# Patient Record
Sex: FEMALE | Race: WHITE | Employment: OTHER | URBAN - METROPOLITAN AREA
[De-identification: names, ages, dates, MRNs, and addresses within clinical notes are randomized per-mention and may not be internally consistent; named-entity substitution may affect disease eponyms.]

---

## 2017-01-01 ENCOUNTER — HOSPITAL ENCOUNTER (EMERGENCY)
Facility: HOSPITAL | Age: 82
Discharge: HOME | End: 2017-01-01
Attending: EMERGENCY MEDICINE
Payer: MEDICARE

## 2017-01-01 ENCOUNTER — HOSPITAL ENCOUNTER (INPATIENT)
Facility: HOSPITAL | Age: 82
LOS: 9 days | Discharge: HOME HEALTH | DRG: 266 | End: 2017-01-01
Attending: INTERNAL MEDICINE | Admitting: INTERNAL MEDICINE
Payer: MEDICARE

## 2017-01-01 ENCOUNTER — APPOINTMENT (OUTPATIENT)
Dept: INTERVENTIONAL RADIOLOGY/VASCULAR | Facility: HOSPITAL | Age: 82
DRG: 280 | End: 2017-01-01
Attending: INTERNAL MEDICINE
Payer: MEDICARE

## 2017-01-01 ENCOUNTER — APPOINTMENT (OUTPATIENT)
Dept: CV DIAGNOSTICS | Facility: HOSPITAL | Age: 82
DRG: 266 | End: 2017-01-01
Attending: INTERNAL MEDICINE
Payer: MEDICARE

## 2017-01-01 ENCOUNTER — HOSPITAL ENCOUNTER (INPATIENT)
Facility: HOSPITAL | Age: 82
LOS: 9 days | Discharge: HOSPICE | DRG: 280 | End: 2017-01-01
Attending: EMERGENCY MEDICINE | Admitting: INTERNAL MEDICINE
Payer: MEDICARE

## 2017-01-01 ENCOUNTER — APPOINTMENT (OUTPATIENT)
Dept: PHYSICAL THERAPY | Facility: HOSPITAL | Age: 82
End: 2017-01-01
Attending: INTERNAL MEDICINE
Payer: MEDICARE

## 2017-01-01 ENCOUNTER — APPOINTMENT (OUTPATIENT)
Dept: GENERAL RADIOLOGY | Facility: HOSPITAL | Age: 82
DRG: 280 | End: 2017-01-01
Attending: INTERNAL MEDICINE
Payer: MEDICARE

## 2017-01-01 ENCOUNTER — APPOINTMENT (OUTPATIENT)
Dept: ULTRASOUND IMAGING | Facility: HOSPITAL | Age: 82
DRG: 266 | End: 2017-01-01
Attending: HOSPITALIST
Payer: MEDICARE

## 2017-01-01 ENCOUNTER — APPOINTMENT (OUTPATIENT)
Dept: GENERAL RADIOLOGY | Facility: HOSPITAL | Age: 82
DRG: 280 | End: 2017-01-01
Attending: HOSPITALIST
Payer: MEDICARE

## 2017-01-01 ENCOUNTER — APPOINTMENT (OUTPATIENT)
Dept: GENERAL RADIOLOGY | Facility: HOSPITAL | Age: 82
DRG: 280 | End: 2017-01-01
Attending: EMERGENCY MEDICINE
Payer: MEDICARE

## 2017-01-01 ENCOUNTER — HOSPITAL ENCOUNTER (INPATIENT)
Facility: HOSPITAL | Age: 82
LOS: 1 days | DRG: 283 | End: 2017-01-01
Attending: HOSPITALIST | Admitting: HOSPITALIST
Payer: OTHER MISCELLANEOUS

## 2017-01-01 ENCOUNTER — APPOINTMENT (OUTPATIENT)
Dept: INTERVENTIONAL RADIOLOGY/VASCULAR | Facility: HOSPITAL | Age: 82
DRG: 266 | End: 2017-01-01
Attending: NURSE PRACTITIONER
Payer: MEDICARE

## 2017-01-01 ENCOUNTER — HOSPITAL ENCOUNTER (OUTPATIENT)
Dept: INTERVENTIONAL RADIOLOGY/VASCULAR | Facility: HOSPITAL | Age: 82
Discharge: HOME | End: 2017-01-01
Attending: INTERNAL MEDICINE | Admitting: INTERNAL MEDICINE
Payer: MEDICARE

## 2017-01-01 ENCOUNTER — HOSPITAL ENCOUNTER (OUTPATIENT)
Dept: PHYSICAL THERAPY | Facility: HOSPITAL | Age: 82
Setting detail: THERAPIES SERIES
Discharge: HOME | End: 2017-01-01
Attending: INTERNAL MEDICINE
Payer: MEDICARE

## 2017-01-01 ENCOUNTER — APPOINTMENT (OUTPATIENT)
Dept: GENERAL RADIOLOGY | Facility: HOSPITAL | Age: 82
DRG: 266 | End: 2017-01-01
Attending: INTERNAL MEDICINE
Payer: MEDICARE

## 2017-01-01 ENCOUNTER — SURGERY (OUTPATIENT)
Age: 82
End: 2017-01-01

## 2017-01-01 ENCOUNTER — APPOINTMENT (OUTPATIENT)
Dept: CV DIAGNOSTICS | Facility: HOSPITAL | Age: 82
DRG: 280 | End: 2017-01-01
Attending: INTERNAL MEDICINE
Payer: MEDICARE

## 2017-01-01 ENCOUNTER — ANESTHESIA EVENT (OUTPATIENT)
Dept: CARDIAC SURGERY | Facility: HOSPITAL | Age: 82
End: 2017-01-01

## 2017-01-01 ENCOUNTER — ANESTHESIA (OUTPATIENT)
Dept: CARDIAC SURGERY | Facility: HOSPITAL | Age: 82
End: 2017-01-01

## 2017-01-01 ENCOUNTER — HOSPITAL ENCOUNTER (OUTPATIENT)
Dept: CT IMAGING | Facility: HOSPITAL | Age: 82
Discharge: HOME | End: 2017-01-01
Payer: MEDICARE

## 2017-01-01 ENCOUNTER — HOSPITAL ENCOUNTER (OUTPATIENT)
Dept: ULTRASOUND IMAGING | Facility: HOSPITAL | Age: 82
Discharge: HOME | End: 2017-01-01
Payer: MEDICARE

## 2017-01-01 ENCOUNTER — APPOINTMENT (OUTPATIENT)
Dept: GENERAL RADIOLOGY | Facility: HOSPITAL | Age: 82
End: 2017-01-01
Attending: EMERGENCY MEDICINE
Payer: MEDICARE

## 2017-01-01 ENCOUNTER — APPOINTMENT (OUTPATIENT)
Dept: CT IMAGING | Facility: HOSPITAL | Age: 82
DRG: 280 | End: 2017-01-01
Attending: EMERGENCY MEDICINE
Payer: MEDICARE

## 2017-01-01 ENCOUNTER — HOSPITAL ENCOUNTER (OUTPATIENT)
Dept: CT IMAGING | Facility: HOSPITAL | Age: 82
Discharge: HOME | End: 2017-01-01
Attending: NURSE PRACTITIONER
Payer: MEDICARE

## 2017-01-01 ENCOUNTER — HOSPITAL ENCOUNTER (OUTPATIENT)
Dept: ULTRASOUND IMAGING | Facility: HOSPITAL | Age: 82
Discharge: HOME | End: 2017-01-01
Attending: INTERNAL MEDICINE
Payer: MEDICARE

## 2017-01-01 VITALS
RESPIRATION RATE: 11 BRPM | HEART RATE: 68 BPM | HEIGHT: 66 IN | BODY MASS INDEX: 18.32 KG/M2 | OXYGEN SATURATION: 97 % | DIASTOLIC BLOOD PRESSURE: 70 MMHG | SYSTOLIC BLOOD PRESSURE: 149 MMHG | WEIGHT: 114 LBS | TEMPERATURE: 98 F

## 2017-01-01 VITALS
HEIGHT: 66 IN | HEART RATE: 70 BPM | SYSTOLIC BLOOD PRESSURE: 139 MMHG | BODY MASS INDEX: 17.47 KG/M2 | OXYGEN SATURATION: 96 % | TEMPERATURE: 99 F | RESPIRATION RATE: 42 BRPM | DIASTOLIC BLOOD PRESSURE: 70 MMHG | WEIGHT: 108.69 LBS

## 2017-01-01 VITALS
HEART RATE: 66 BPM | HEIGHT: 66 IN | OXYGEN SATURATION: 97 % | SYSTOLIC BLOOD PRESSURE: 146 MMHG | TEMPERATURE: 98 F | RESPIRATION RATE: 16 BRPM | DIASTOLIC BLOOD PRESSURE: 73 MMHG | BODY MASS INDEX: 19.61 KG/M2 | WEIGHT: 122 LBS

## 2017-01-01 VITALS
BODY MASS INDEX: 19 KG/M2 | DIASTOLIC BLOOD PRESSURE: 72 MMHG | SYSTOLIC BLOOD PRESSURE: 156 MMHG | WEIGHT: 117.94 LBS | OXYGEN SATURATION: 97 % | HEART RATE: 76 BPM | RESPIRATION RATE: 17 BRPM | TEMPERATURE: 98 F

## 2017-01-01 VITALS
HEART RATE: 61 BPM | RESPIRATION RATE: 17 BRPM | WEIGHT: 121.06 LBS | BODY MASS INDEX: 20 KG/M2 | DIASTOLIC BLOOD PRESSURE: 78 MMHG | OXYGEN SATURATION: 100 % | TEMPERATURE: 98 F | SYSTOLIC BLOOD PRESSURE: 132 MMHG

## 2017-01-01 VITALS
HEIGHT: 66 IN | WEIGHT: 110.25 LBS | OXYGEN SATURATION: 97 % | HEART RATE: 68 BPM | TEMPERATURE: 98 F | BODY MASS INDEX: 17.72 KG/M2 | SYSTOLIC BLOOD PRESSURE: 126 MMHG | DIASTOLIC BLOOD PRESSURE: 66 MMHG | RESPIRATION RATE: 16 BRPM

## 2017-01-01 VITALS — RESPIRATION RATE: 26 BRPM

## 2017-01-01 DIAGNOSIS — G89.29 CHRONIC BILATERAL LOW BACK PAIN WITHOUT SCIATICA: Primary | ICD-10-CM

## 2017-01-01 DIAGNOSIS — I35.0 NONRHEUMATIC AORTIC VALVE STENOSIS: ICD-10-CM

## 2017-01-01 DIAGNOSIS — M54.16 LUMBAR RADICULITIS: Primary | ICD-10-CM

## 2017-01-01 DIAGNOSIS — I35.0 SEVERE AORTIC STENOSIS: ICD-10-CM

## 2017-01-01 DIAGNOSIS — M79.89 LEFT ARM SWELLING: ICD-10-CM

## 2017-01-01 DIAGNOSIS — Z01.810 PRE-OPERATIVE CARDIOVASCULAR EXAMINATION: ICD-10-CM

## 2017-01-01 DIAGNOSIS — R42 LIGHTHEADEDNESS: Primary | ICD-10-CM

## 2017-01-01 DIAGNOSIS — M54.42 ACUTE LEFT-SIDED LOW BACK PAIN WITH LEFT-SIDED SCIATICA: Primary | ICD-10-CM

## 2017-01-01 DIAGNOSIS — I82.622 ARM DVT (DEEP VENOUS THROMBOEMBOLISM), ACUTE, LEFT (HCC): Primary | ICD-10-CM

## 2017-01-01 DIAGNOSIS — M48.061 LUMBAR STENOSIS: ICD-10-CM

## 2017-01-01 DIAGNOSIS — R07.9 ACUTE CHEST PAIN: Primary | ICD-10-CM

## 2017-01-01 DIAGNOSIS — M54.50 CHRONIC BILATERAL LOW BACK PAIN WITHOUT SCIATICA: Primary | ICD-10-CM

## 2017-01-01 LAB
ALBUMIN SERPL-MCNC: 3.2 G/DL (ref 3.5–4.8)
ALBUMIN SERPL-MCNC: 3.7 G/DL (ref 3.5–4.8)
ALP LIVER SERPL-CCNC: 114 U/L (ref 55–142)
ALP LIVER SERPL-CCNC: 117 U/L (ref 55–142)
ALT SERPL-CCNC: 19 U/L (ref 14–54)
ALT SERPL-CCNC: 21 U/L (ref 14–54)
AST SERPL-CCNC: 25 U/L (ref 15–41)
AST SERPL-CCNC: 27 U/L (ref 15–41)
ATRIAL RATE: 65 BPM
ATRIAL RATE: 70 BPM
BASOPHILS # BLD AUTO: 0.05 X10(3) UL (ref 0–0.1)
BASOPHILS # BLD AUTO: 0.06 X10(3) UL (ref 0–0.1)
BASOPHILS NFR BLD AUTO: 1 %
BASOPHILS NFR BLD AUTO: 1.3 %
BILIRUB SERPL-MCNC: 0.4 MG/DL (ref 0.1–2)
BILIRUB SERPL-MCNC: 0.5 MG/DL (ref 0.1–2)
BILIRUB UR QL STRIP.AUTO: NEGATIVE
BILIRUB UR QL STRIP.AUTO: NEGATIVE
BUN BLD-MCNC: 22 MG/DL (ref 8–20)
BUN BLD-MCNC: 27 MG/DL (ref 8–20)
CALCIUM BLD-MCNC: 8.8 MG/DL (ref 8.3–10.3)
CALCIUM BLD-MCNC: 9.6 MG/DL (ref 8.3–10.3)
CHLORIDE: 105 MMOL/L (ref 101–111)
CHLORIDE: 108 MMOL/L (ref 101–111)
CLARITY UR REFRACT.AUTO: CLEAR
CLARITY UR REFRACT.AUTO: CLEAR
CO2: 28 MMOL/L (ref 22–32)
CO2: 28 MMOL/L (ref 22–32)
COLOR UR AUTO: YELLOW
COLOR UR AUTO: YELLOW
CREAT BLD-MCNC: 0.72 MG/DL (ref 0.55–1.02)
CREAT BLD-MCNC: 0.75 MG/DL (ref 0.55–1.02)
EOSINOPHIL # BLD AUTO: 0.09 X10(3) UL (ref 0–0.3)
EOSINOPHIL # BLD AUTO: 0.14 X10(3) UL (ref 0–0.3)
EOSINOPHIL NFR BLD AUTO: 1.8 %
EOSINOPHIL NFR BLD AUTO: 3 %
ERYTHROCYTE [DISTWIDTH] IN BLOOD BY AUTOMATED COUNT: 12.6 % (ref 11.5–16)
ERYTHROCYTE [DISTWIDTH] IN BLOOD BY AUTOMATED COUNT: 12.9 % (ref 11.5–16)
GLUCOSE BLD-MCNC: 90 MG/DL (ref 70–99)
GLUCOSE BLD-MCNC: 96 MG/DL (ref 70–99)
GLUCOSE UR STRIP.AUTO-MCNC: NEGATIVE MG/DL
GLUCOSE UR STRIP.AUTO-MCNC: NEGATIVE MG/DL
HAV IGM SER QL: 2.3 MG/DL (ref 1.7–3)
HCT VFR BLD AUTO: 31.7 % (ref 34–50)
HCT VFR BLD AUTO: 36 % (ref 34–50)
HGB BLD-MCNC: 10 G/DL (ref 12–16)
HGB BLD-MCNC: 11.8 G/DL (ref 12–16)
IMMATURE GRANULOCYTE COUNT: 0.01 X10(3) UL (ref 0–1)
IMMATURE GRANULOCYTE COUNT: 0.01 X10(3) UL (ref 0–1)
IMMATURE GRANULOCYTE RATIO %: 0.2 %
IMMATURE GRANULOCYTE RATIO %: 0.2 %
KETONES UR STRIP.AUTO-MCNC: NEGATIVE MG/DL
KETONES UR STRIP.AUTO-MCNC: NEGATIVE MG/DL
LEUKOCYTE ESTERASE UR QL STRIP.AUTO: NEGATIVE
LEUKOCYTE ESTERASE UR QL STRIP.AUTO: NEGATIVE
LYMPHOCYTES # BLD AUTO: 0.89 X10(3) UL (ref 0.9–4)
LYMPHOCYTES # BLD AUTO: 1.08 X10(3) UL (ref 0.9–4)
LYMPHOCYTES NFR BLD AUTO: 17.6 %
LYMPHOCYTES NFR BLD AUTO: 23.4 %
M PROTEIN MFR SERPL ELPH: 7.6 G/DL (ref 6.1–8.3)
M PROTEIN MFR SERPL ELPH: 8 G/DL (ref 6.1–8.3)
MCH RBC QN AUTO: 30.3 PG (ref 27–33.2)
MCH RBC QN AUTO: 31.3 PG (ref 27–33.2)
MCHC RBC AUTO-ENTMCNC: 31.5 G/DL (ref 31–37)
MCHC RBC AUTO-ENTMCNC: 32.8 G/DL (ref 31–37)
MCV RBC AUTO: 95.5 FL (ref 81–100)
MCV RBC AUTO: 96.1 FL (ref 81–100)
MONOCYTES # BLD AUTO: 0.62 X10(3) UL (ref 0.1–0.6)
MONOCYTES # BLD AUTO: 0.66 X10(3) UL (ref 0.1–0.6)
MONOCYTES NFR BLD AUTO: 13.1 %
MONOCYTES NFR BLD AUTO: 13.4 %
NEUTROPHIL ABS PRELIM: 2.7 X10 (3) UL (ref 1.3–6.7)
NEUTROPHIL ABS PRELIM: 3.35 X10 (3) UL (ref 1.3–6.7)
NEUTROPHILS # BLD AUTO: 2.7 X10(3) UL (ref 1.3–6.7)
NEUTROPHILS # BLD AUTO: 3.35 X10(3) UL (ref 1.3–6.7)
NEUTROPHILS NFR BLD AUTO: 58.7 %
NEUTROPHILS NFR BLD AUTO: 66.3 %
NITRITE UR QL STRIP.AUTO: NEGATIVE
NITRITE UR QL STRIP.AUTO: NEGATIVE
P AXIS: 95 DEGREES
P-R INTERVAL: 162 MS
P-R INTERVAL: 186 MS
PH UR STRIP.AUTO: 5 [PH] (ref 4.5–8)
PH UR STRIP.AUTO: 6 [PH] (ref 4.5–8)
PHOSPHATE SERPL-MCNC: 2.8 MG/DL (ref 2.5–4.9)
PLATELET # BLD AUTO: 190 10(3)UL (ref 150–450)
PLATELET # BLD AUTO: 213 10(3)UL (ref 150–450)
POTASSIUM SERPL-SCNC: 3.6 MMOL/L (ref 3.6–5.1)
POTASSIUM SERPL-SCNC: 3.8 MMOL/L (ref 3.6–5.1)
PROT UR STRIP.AUTO-MCNC: NEGATIVE MG/DL
PROT UR STRIP.AUTO-MCNC: NEGATIVE MG/DL
Q-T INTERVAL: 468 MS
Q-T INTERVAL: 482 MS
QRS DURATION: 100 MS
QRS DURATION: 172 MS
QTC CALCULATION (BEZET): 486 MS
QTC CALCULATION (BEZET): 520 MS
R AXIS: -22 DEGREES
R AXIS: 47 DEGREES
RBC # BLD AUTO: 3.3 X10(6)UL (ref 3.8–5.1)
RBC # BLD AUTO: 3.77 X10(6)UL (ref 3.8–5.1)
RBC UR QL AUTO: NEGATIVE
RED CELL DISTRIBUTION WIDTH-SD: 43.9 FL (ref 35.1–46.3)
RED CELL DISTRIBUTION WIDTH-SD: 45.4 FL (ref 35.1–46.3)
SODIUM SERPL-SCNC: 139 MMOL/L (ref 136–144)
SODIUM SERPL-SCNC: 142 MMOL/L (ref 136–144)
SP GR UR STRIP.AUTO: 1.01 (ref 1–1.03)
SP GR UR STRIP.AUTO: 1.02 (ref 1–1.03)
T AXIS: 121 DEGREES
T AXIS: 92 DEGREES
UROBILINOGEN UR STRIP.AUTO-MCNC: <2 MG/DL
UROBILINOGEN UR STRIP.AUTO-MCNC: <2 MG/DL
VENTRICULAR RATE: 65 BPM
VENTRICULAR RATE: 70 BPM
WBC # BLD AUTO: 4.6 X10(3) UL (ref 4–13)
WBC # BLD AUTO: 5.1 X10(3) UL (ref 4–13)

## 2017-01-01 PROCEDURE — 02HK3JZ INSERTION OF PACEMAKER LEAD INTO RIGHT VENTRICLE, PERCUTANEOUS APPROACH: ICD-10-PCS | Performed by: INTERNAL MEDICINE

## 2017-01-01 PROCEDURE — 97110 THERAPEUTIC EXERCISES: CPT

## 2017-01-01 PROCEDURE — 71010 XR CHEST AP PORTABLE  (CPT=71010): CPT

## 2017-01-01 PROCEDURE — 97530 THERAPEUTIC ACTIVITIES: CPT

## 2017-01-01 PROCEDURE — 85027 COMPLETE CBC AUTOMATED: CPT | Performed by: HOSPITALIST

## 2017-01-01 PROCEDURE — 97140 MANUAL THERAPY 1/> REGIONS: CPT

## 2017-01-01 PROCEDURE — 99153 MOD SED SAME PHYS/QHP EA: CPT

## 2017-01-01 PROCEDURE — 80053 COMPREHEN METABOLIC PANEL: CPT | Performed by: EMERGENCY MEDICINE

## 2017-01-01 PROCEDURE — 83735 ASSAY OF MAGNESIUM: CPT | Performed by: INTERNAL MEDICINE

## 2017-01-01 PROCEDURE — 93325 DOPPLER ECHO COLOR FLOW MAPG: CPT

## 2017-01-01 PROCEDURE — 93971 EXTREMITY STUDY: CPT | Performed by: INTERNAL MEDICINE

## 2017-01-01 PROCEDURE — 0JH606Z INSERTION OF PACEMAKER, DUAL CHAMBER INTO CHEST SUBCUTANEOUS TISSUE AND FASCIA, OPEN APPROACH: ICD-10-PCS | Performed by: INTERNAL MEDICINE

## 2017-01-01 PROCEDURE — 99152 MOD SED SAME PHYS/QHP 5/>YRS: CPT

## 2017-01-01 PROCEDURE — 87040 BLOOD CULTURE FOR BACTERIA: CPT | Performed by: INTERNAL MEDICINE

## 2017-01-01 PROCEDURE — 85025 COMPLETE CBC W/AUTO DIFF WBC: CPT | Performed by: EMERGENCY MEDICINE

## 2017-01-01 PROCEDURE — 85025 COMPLETE CBC W/AUTO DIFF WBC: CPT | Performed by: HOSPITALIST

## 2017-01-01 PROCEDURE — 85610 PROTHROMBIN TIME: CPT | Performed by: INTERNAL MEDICINE

## 2017-01-01 PROCEDURE — 86901 BLOOD TYPING SEROLOGIC RH(D): CPT | Performed by: INTERNAL MEDICINE

## 2017-01-01 PROCEDURE — 93306 TTE W/DOPPLER COMPLETE: CPT

## 2017-01-01 PROCEDURE — 82565 ASSAY OF CREATININE: CPT | Performed by: INTERNAL MEDICINE

## 2017-01-01 PROCEDURE — 71010 XR CHEST AP PORTABLE  (CPT=71010): CPT | Performed by: HOSPITALIST

## 2017-01-01 PROCEDURE — 4A023N6 MEASUREMENT OF CARDIAC SAMPLING AND PRESSURE, RIGHT HEART, PERCUTANEOUS APPROACH: ICD-10-PCS | Performed by: INTERNAL MEDICINE

## 2017-01-01 PROCEDURE — 71010 XR CHEST AP PORTABLE  (CPT=71010): CPT | Performed by: EMERGENCY MEDICINE

## 2017-01-01 PROCEDURE — 97161 PT EVAL LOW COMPLEX 20 MIN: CPT

## 2017-01-01 PROCEDURE — 93005 ELECTROCARDIOGRAM TRACING: CPT

## 2017-01-01 PROCEDURE — 4B02XSZ MEASUREMENT OF CARDIAC PACEMAKER, EXTERNAL APPROACH: ICD-10-PCS | Performed by: INTERNAL MEDICINE

## 2017-01-01 PROCEDURE — 97116 GAIT TRAINING THERAPY: CPT

## 2017-01-01 PROCEDURE — 84100 ASSAY OF PHOSPHORUS: CPT | Performed by: EMERGENCY MEDICINE

## 2017-01-01 PROCEDURE — 05H533Z INSERTION OF INFUSION DEVICE INTO RIGHT SUBCLAVIAN VEIN, PERCUTANEOUS APPROACH: ICD-10-PCS | Performed by: HOSPITALIST

## 2017-01-01 PROCEDURE — 99284 EMERGENCY DEPT VISIT MOD MDM: CPT

## 2017-01-01 PROCEDURE — 97166 OT EVAL MOD COMPLEX 45 MIN: CPT

## 2017-01-01 PROCEDURE — 93320 DOPPLER ECHO COMPLETE: CPT

## 2017-01-01 PROCEDURE — 97112 NEUROMUSCULAR REEDUCATION: CPT

## 2017-01-01 PROCEDURE — 81001 URINALYSIS AUTO W/SCOPE: CPT | Performed by: INTERNAL MEDICINE

## 2017-01-01 PROCEDURE — 36415 COLL VENOUS BLD VENIPUNCTURE: CPT

## 2017-01-01 PROCEDURE — 96374 THER/PROPH/DIAG INJ IV PUSH: CPT

## 2017-01-01 PROCEDURE — 84132 ASSAY OF SERUM POTASSIUM: CPT | Performed by: INTERNAL MEDICINE

## 2017-01-01 PROCEDURE — 93010 ELECTROCARDIOGRAM REPORT: CPT

## 2017-01-01 PROCEDURE — 93880 EXTRACRANIAL BILAT STUDY: CPT

## 2017-01-01 PROCEDURE — 93456 R HRT CORONARY ARTERY ANGIO: CPT

## 2017-01-01 PROCEDURE — 85025 COMPLETE CBC W/AUTO DIFF WBC: CPT | Performed by: INTERNAL MEDICINE

## 2017-01-01 PROCEDURE — 85027 COMPLETE CBC AUTOMATED: CPT | Performed by: INTERNAL MEDICINE

## 2017-01-01 PROCEDURE — 87081 CULTURE SCREEN ONLY: CPT | Performed by: INTERNAL MEDICINE

## 2017-01-01 PROCEDURE — B41DYZZ FLUOROSCOPY OF AORTA AND BILATERAL LOWER EXTREMITY ARTERIES USING OTHER CONTRAST: ICD-10-PCS | Performed by: INTERNAL MEDICINE

## 2017-01-01 PROCEDURE — 80048 BASIC METABOLIC PNL TOTAL CA: CPT | Performed by: INTERNAL MEDICINE

## 2017-01-01 PROCEDURE — 82962 GLUCOSE BLOOD TEST: CPT

## 2017-01-01 PROCEDURE — 81001 URINALYSIS AUTO W/SCOPE: CPT | Performed by: EMERGENCY MEDICINE

## 2017-01-01 PROCEDURE — 80048 BASIC METABOLIC PNL TOTAL CA: CPT | Performed by: HOSPITALIST

## 2017-01-01 PROCEDURE — B41F1ZZ FLUOROSCOPY OF RIGHT LOWER EXTREMITY ARTERIES USING LOW OSMOLAR CONTRAST: ICD-10-PCS | Performed by: INTERNAL MEDICINE

## 2017-01-01 PROCEDURE — 93970 EXTREMITY STUDY: CPT

## 2017-01-01 PROCEDURE — 73130 X-RAY EXAM OF HAND: CPT | Performed by: HOSPITALIST

## 2017-01-01 PROCEDURE — 80053 COMPREHEN METABOLIC PANEL: CPT | Performed by: INTERNAL MEDICINE

## 2017-01-01 PROCEDURE — 71010 XR CHEST AP PORTABLE  (CPT=71010): CPT | Performed by: INTERNAL MEDICINE

## 2017-01-01 PROCEDURE — 93567 NJX CAR CTH SPRVLV AORTGRPHY: CPT

## 2017-01-01 PROCEDURE — 71275 CT ANGIOGRAPHY CHEST: CPT | Performed by: EMERGENCY MEDICINE

## 2017-01-01 PROCEDURE — 75635 CT ANGIO ABDOMINAL ARTERIES: CPT

## 2017-01-01 PROCEDURE — 81003 URINALYSIS AUTO W/O SCOPE: CPT | Performed by: EMERGENCY MEDICINE

## 2017-01-01 PROCEDURE — B2111ZZ FLUOROSCOPY OF MULTIPLE CORONARY ARTERIES USING LOW OSMOLAR CONTRAST: ICD-10-PCS | Performed by: INTERNAL MEDICINE

## 2017-01-01 PROCEDURE — 93010 ELECTROCARDIOGRAM REPORT: CPT | Performed by: INTERNAL MEDICINE

## 2017-01-01 PROCEDURE — 96361 HYDRATE IV INFUSION ADD-ON: CPT

## 2017-01-01 PROCEDURE — 97164 PT RE-EVAL EST PLAN CARE: CPT

## 2017-01-01 PROCEDURE — 93306 TTE W/DOPPLER COMPLETE: CPT | Performed by: INTERNAL MEDICINE

## 2017-01-01 PROCEDURE — 83880 ASSAY OF NATRIURETIC PEPTIDE: CPT | Performed by: INTERNAL MEDICINE

## 2017-01-01 PROCEDURE — 33208 INSRT HEART PM ATRIAL & VENT: CPT

## 2017-01-01 PROCEDURE — 99285 EMERGENCY DEPT VISIT HI MDM: CPT

## 2017-01-01 PROCEDURE — 5A09457 ASSISTANCE WITH RESPIRATORY VENTILATION, 24-96 CONSECUTIVE HOURS, CONTINUOUS POSITIVE AIRWAY PRESSURE: ICD-10-PCS | Performed by: HOSPITALIST

## 2017-01-01 PROCEDURE — 86900 BLOOD TYPING SEROLOGIC ABO: CPT | Performed by: INTERNAL MEDICINE

## 2017-01-01 PROCEDURE — 84100 ASSAY OF PHOSPHORUS: CPT | Performed by: INTERNAL MEDICINE

## 2017-01-01 PROCEDURE — 84145 PROCALCITONIN (PCT): CPT | Performed by: INTERNAL MEDICINE

## 2017-01-01 PROCEDURE — 72110 X-RAY EXAM L-2 SPINE 4/>VWS: CPT

## 2017-01-01 PROCEDURE — 97162 PT EVAL MOD COMPLEX 30 MIN: CPT

## 2017-01-01 PROCEDURE — B24BZZ4 ULTRASONOGRAPHY OF HEART WITH AORTA, TRANSESOPHAGEAL: ICD-10-PCS | Performed by: INTERNAL MEDICINE

## 2017-01-01 PROCEDURE — 83036 HEMOGLOBIN GLYCOSYLATED A1C: CPT | Performed by: INTERNAL MEDICINE

## 2017-01-01 PROCEDURE — 86850 RBC ANTIBODY SCREEN: CPT | Performed by: INTERNAL MEDICINE

## 2017-01-01 PROCEDURE — 99233 SBSQ HOSP IP/OBS HIGH 50: CPT | Performed by: CLINICAL NURSE SPECIALIST

## 2017-01-01 PROCEDURE — 99283 EMERGENCY DEPT VISIT LOW MDM: CPT

## 2017-01-01 PROCEDURE — 71275 CT ANGIOGRAPHY CHEST: CPT

## 2017-01-01 PROCEDURE — 85384 FIBRINOGEN ACTIVITY: CPT | Performed by: INTERNAL MEDICINE

## 2017-01-01 PROCEDURE — 90792 PSYCH DIAG EVAL W/MED SRVCS: CPT | Performed by: OTHER

## 2017-01-01 PROCEDURE — 83735 ASSAY OF MAGNESIUM: CPT | Performed by: EMERGENCY MEDICINE

## 2017-01-01 PROCEDURE — B517YZZ FLUOROSCOPY OF LEFT SUBCLAVIAN VEIN USING OTHER CONTRAST: ICD-10-PCS | Performed by: INTERNAL MEDICINE

## 2017-01-01 PROCEDURE — 84132 ASSAY OF SERUM POTASSIUM: CPT | Performed by: HOSPITALIST

## 2017-01-01 PROCEDURE — 71020 XR CHEST PA + LAT CHEST (CPT=71020): CPT | Performed by: INTERNAL MEDICINE

## 2017-01-01 PROCEDURE — 81003 URINALYSIS AUTO W/O SCOPE: CPT | Performed by: INTERNAL MEDICINE

## 2017-01-01 PROCEDURE — 02H63JZ INSERTION OF PACEMAKER LEAD INTO RIGHT ATRIUM, PERCUTANEOUS APPROACH: ICD-10-PCS | Performed by: INTERNAL MEDICINE

## 2017-01-01 PROCEDURE — 93355 ECHO TRANSESOPHAGEAL (TEE): CPT

## 2017-01-01 PROCEDURE — 02RF38Z REPLACEMENT OF AORTIC VALVE WITH ZOOPLASTIC TISSUE, PERCUTANEOUS APPROACH: ICD-10-PCS | Performed by: INTERNAL MEDICINE

## 2017-01-01 PROCEDURE — 86920 COMPATIBILITY TEST SPIN: CPT

## 2017-01-01 PROCEDURE — 85730 THROMBOPLASTIN TIME PARTIAL: CPT | Performed by: INTERNAL MEDICINE

## 2017-01-01 PROCEDURE — 87081 CULTURE SCREEN ONLY: CPT | Performed by: HOSPITALIST

## 2017-01-01 PROCEDURE — 4A023N7 MEASUREMENT OF CARDIAC SAMPLING AND PRESSURE, LEFT HEART, PERCUTANEOUS APPROACH: ICD-10-PCS | Performed by: INTERNAL MEDICINE

## 2017-01-01 DEVICE — VALVE SAPIEN 26MM COMMANDER: Type: IMPLANTABLE DEVICE | Site: AORTA | Status: FUNCTIONAL

## 2017-01-01 RX ORDER — ONDANSETRON 2 MG/ML
4 INJECTION INTRAMUSCULAR; INTRAVENOUS EVERY 6 HOURS PRN
Status: DISCONTINUED | OUTPATIENT
Start: 2017-01-01 | End: 2017-01-01

## 2017-01-01 RX ORDER — HYDROCODONE BITARTRATE AND ACETAMINOPHEN 5; 325 MG/1; MG/1
1-2 TABLET ORAL EVERY 6 HOURS PRN
Qty: 20 TABLET | Refills: 0 | Status: SHIPPED | OUTPATIENT
Start: 2017-01-01 | End: 2017-01-01

## 2017-01-01 RX ORDER — AMIODARONE HYDROCHLORIDE 200 MG/1
400 TABLET ORAL 2 TIMES DAILY WITH MEALS
Status: DISCONTINUED | OUTPATIENT
Start: 2017-01-01 | End: 2017-01-01

## 2017-01-01 RX ORDER — POLYVINYL ALCOHOL 14 MG/ML
2 SOLUTION/ DROPS OPHTHALMIC
Status: DISCONTINUED | OUTPATIENT
Start: 2017-01-01 | End: 2017-01-01

## 2017-01-01 RX ORDER — GLYCOPYRROLATE 0.2 MG/ML
0.4 INJECTION, SOLUTION INTRAMUSCULAR; INTRAVENOUS
Status: DISCONTINUED | OUTPATIENT
Start: 2017-01-01 | End: 2017-01-01

## 2017-01-01 RX ORDER — ONDANSETRON 2 MG/ML
4 INJECTION INTRAMUSCULAR; INTRAVENOUS EVERY 6 HOURS PRN
Status: ACTIVE | OUTPATIENT
Start: 2017-01-01 | End: 2017-01-01

## 2017-01-01 RX ORDER — IPRATROPIUM BROMIDE AND ALBUTEROL SULFATE 2.5; .5 MG/3ML; MG/3ML
3 SOLUTION RESPIRATORY (INHALATION) EVERY 4 HOURS PRN
Status: DISCONTINUED | OUTPATIENT
Start: 2017-01-01 | End: 2017-01-01

## 2017-01-01 RX ORDER — ENOXAPARIN SODIUM 100 MG/ML
40 INJECTION SUBCUTANEOUS DAILY
Status: DISCONTINUED | OUTPATIENT
Start: 2017-01-01 | End: 2017-01-01

## 2017-01-01 RX ORDER — POTASSIUM CHLORIDE 20 MEQ/1
40 TABLET, EXTENDED RELEASE ORAL ONCE
Status: COMPLETED | OUTPATIENT
Start: 2017-01-01 | End: 2017-01-01

## 2017-01-01 RX ORDER — ASPIRIN 81 MG/1
81 TABLET ORAL DAILY
Status: DISCONTINUED | OUTPATIENT
Start: 2017-01-01 | End: 2017-01-01

## 2017-01-01 RX ORDER — LORAZEPAM 2 MG/ML
2 INJECTION INTRAMUSCULAR EVERY 4 HOURS PRN
Status: DISCONTINUED | OUTPATIENT
Start: 2017-01-01 | End: 2017-01-01

## 2017-01-01 RX ORDER — ALBUMIN, HUMAN INJ 5% 5 %
250 SOLUTION INTRAVENOUS ONCE AS NEEDED
Status: ACTIVE | OUTPATIENT
Start: 2017-01-01 | End: 2017-01-01

## 2017-01-01 RX ORDER — BISACODYL 10 MG
10 SUPPOSITORY, RECTAL RECTAL
Status: DISCONTINUED | OUTPATIENT
Start: 2017-01-01 | End: 2017-01-01

## 2017-01-01 RX ORDER — ALFUZOSIN HYDROCHLORIDE 10 MG/1
10 TABLET, EXTENDED RELEASE ORAL
Status: DISCONTINUED | OUTPATIENT
Start: 2017-01-01 | End: 2017-01-01

## 2017-01-01 RX ORDER — ACETAMINOPHEN 650 MG/1
650 SUPPOSITORY RECTAL EVERY 6 HOURS PRN
Status: DISCONTINUED | OUTPATIENT
Start: 2017-01-01 | End: 2017-01-01

## 2017-01-01 RX ORDER — ACETAMINOPHEN 325 MG/1
650 TABLET ORAL EVERY 6 HOURS PRN
Status: DISCONTINUED | OUTPATIENT
Start: 2017-01-01 | End: 2017-01-01

## 2017-01-01 RX ORDER — DOCUSATE SODIUM 100 MG/1
100 CAPSULE, LIQUID FILLED ORAL 2 TIMES DAILY
Status: DISCONTINUED | OUTPATIENT
Start: 2017-01-01 | End: 2017-01-01

## 2017-01-01 RX ORDER — HEPARIN SODIUM 5000 [USP'U]/ML
INJECTION, SOLUTION INTRAVENOUS; SUBCUTANEOUS
Status: COMPLETED
Start: 2017-01-01 | End: 2017-01-01

## 2017-01-01 RX ORDER — MORPHINE SULFATE 4 MG/ML
1 INJECTION, SOLUTION INTRAMUSCULAR; INTRAVENOUS
Status: DISCONTINUED | OUTPATIENT
Start: 2017-01-01 | End: 2017-01-01

## 2017-01-01 RX ORDER — LORAZEPAM 2 MG/ML
1 INJECTION INTRAMUSCULAR EVERY 4 HOURS PRN
Status: DISCONTINUED | OUTPATIENT
Start: 2017-01-01 | End: 2017-01-01

## 2017-01-01 RX ORDER — LATANOPROST 50 UG/ML
1 SOLUTION/ DROPS OPHTHALMIC NIGHTLY
Status: DISCONTINUED | OUTPATIENT
Start: 2017-01-01 | End: 2017-01-01

## 2017-01-01 RX ORDER — CEFAZOLIN SODIUM 1 G/3ML
INJECTION, POWDER, FOR SOLUTION INTRAMUSCULAR; INTRAVENOUS
Status: DISCONTINUED | OUTPATIENT
Start: 2017-01-01 | End: 2017-01-01 | Stop reason: HOSPADM

## 2017-01-01 RX ORDER — ASPIRIN 325 MG
325 TABLET, DELAYED RELEASE (ENTERIC COATED) ORAL DAILY
Status: DISCONTINUED | OUTPATIENT
Start: 2017-01-01 | End: 2017-01-01

## 2017-01-01 RX ORDER — SODIUM CHLORIDE 9 MG/ML
INJECTION, SOLUTION INTRAVENOUS CONTINUOUS
Status: DISCONTINUED | OUTPATIENT
Start: 2017-01-01 | End: 2017-01-01

## 2017-01-01 RX ORDER — ALPRAZOLAM 0.25 MG/1
0.25 TABLET ORAL EVERY 12 HOURS PRN
Status: DISCONTINUED | OUTPATIENT
Start: 2017-01-01 | End: 2017-01-01

## 2017-01-01 RX ORDER — MIDAZOLAM HYDROCHLORIDE 1 MG/ML
INJECTION INTRAMUSCULAR; INTRAVENOUS
Status: COMPLETED
Start: 2017-01-01 | End: 2017-01-01

## 2017-01-01 RX ORDER — LORAZEPAM 2 MG/ML
0.25 INJECTION INTRAMUSCULAR EVERY 12 HOURS PRN
Status: DISCONTINUED | OUTPATIENT
Start: 2017-01-01 | End: 2017-01-01

## 2017-01-01 RX ORDER — KETOROLAC TROMETHAMINE 30 MG/ML
15 INJECTION, SOLUTION INTRAMUSCULAR; INTRAVENOUS ONCE
Status: COMPLETED | OUTPATIENT
Start: 2017-01-01 | End: 2017-01-01

## 2017-01-01 RX ORDER — ASPIRIN 81 MG/1
81 TABLET ORAL DAILY
Qty: 90 TABLET | Refills: 3 | Status: SHIPPED | OUTPATIENT
Start: 2017-01-01 | End: 2017-01-01

## 2017-01-01 RX ORDER — TRIAMCINOLONE ACETONIDE 5 MG/G
OINTMENT TOPICAL 2 TIMES DAILY
Status: DISCONTINUED | OUTPATIENT
Start: 2017-01-01 | End: 2017-01-01 | Stop reason: SDUPTHER

## 2017-01-01 RX ORDER — ARIPIPRAZOLE 15 MG/1
40 TABLET ORAL EVERY 4 HOURS
Status: COMPLETED | OUTPATIENT
Start: 2017-01-01 | End: 2017-01-01

## 2017-01-01 RX ORDER — SODIUM CHLORIDE 0.9 % (FLUSH) 0.9 %
10 SYRINGE (ML) INJECTION EVERY 12 HOURS
Status: DISCONTINUED | OUTPATIENT
Start: 2017-01-01 | End: 2017-01-01

## 2017-01-01 RX ORDER — POLYETHYLENE GLYCOL 3350 17 G/17G
17 POWDER, FOR SOLUTION ORAL DAILY PRN
Status: DISCONTINUED | OUTPATIENT
Start: 2017-01-01 | End: 2017-01-01

## 2017-01-01 RX ORDER — METOPROLOL SUCCINATE 25 MG/1
25 TABLET, EXTENDED RELEASE ORAL
Status: DISCONTINUED | OUTPATIENT
Start: 2017-01-01 | End: 2017-01-01

## 2017-01-01 RX ORDER — ASPIRIN 81 MG/1
324 TABLET, CHEWABLE ORAL DAILY
Status: DISCONTINUED | OUTPATIENT
Start: 2017-01-01 | End: 2017-01-01

## 2017-01-01 RX ORDER — FAMOTIDINE 20 MG/1
20 TABLET ORAL DAILY
Status: DISCONTINUED | OUTPATIENT
Start: 2017-01-01 | End: 2017-01-01

## 2017-01-01 RX ORDER — ASPIRIN 81 MG/1
TABLET, CHEWABLE ORAL
Status: DISPENSED
Start: 2017-01-01 | End: 2017-01-01

## 2017-01-01 RX ORDER — POLYVINYL ALCOHOL 14 MG/ML
2 SOLUTION/ DROPS OPHTHALMIC
Status: CANCELLED | OUTPATIENT
Start: 2017-01-01

## 2017-01-01 RX ORDER — FUROSEMIDE 20 MG/1
20 TABLET ORAL DAILY
Status: DISCONTINUED | OUTPATIENT
Start: 2017-01-01 | End: 2017-01-01

## 2017-01-01 RX ORDER — LIDOCAINE HYDROCHLORIDE 10 MG/ML
INJECTION, SOLUTION INFILTRATION; PERINEURAL
Status: COMPLETED
Start: 2017-01-01 | End: 2017-01-01

## 2017-01-01 RX ORDER — ASPIRIN 81 MG/1
TABLET, CHEWABLE ORAL
Status: COMPLETED
Start: 2017-01-01 | End: 2017-01-01

## 2017-01-01 RX ORDER — HYDRALAZINE HYDROCHLORIDE 20 MG/ML
10 INJECTION INTRAMUSCULAR; INTRAVENOUS EVERY 6 HOURS PRN
Status: DISCONTINUED | OUTPATIENT
Start: 2017-01-01 | End: 2017-01-01

## 2017-01-01 RX ORDER — FUROSEMIDE 10 MG/ML
20 INJECTION INTRAMUSCULAR; INTRAVENOUS
Status: DISCONTINUED | OUTPATIENT
Start: 2017-01-01 | End: 2017-01-01

## 2017-01-01 RX ORDER — LACTULOSE 20 G/30ML
20 SOLUTION ORAL DAILY PRN
Status: DISCONTINUED | OUTPATIENT
Start: 2017-01-01 | End: 2017-01-01

## 2017-01-01 RX ORDER — CHLORHEXIDINE GLUCONATE 4 G/100ML
30 SOLUTION TOPICAL
Status: ACTIVE | OUTPATIENT
Start: 2017-01-01 | End: 2017-01-01

## 2017-01-01 RX ORDER — FAMOTIDINE 10 MG/ML
20 INJECTION, SOLUTION INTRAVENOUS DAILY
Status: DISCONTINUED | OUTPATIENT
Start: 2017-01-01 | End: 2017-01-01

## 2017-01-01 RX ORDER — POTASSIUM CHLORIDE 20 MEQ/1
40 TABLET, EXTENDED RELEASE ORAL EVERY 4 HOURS
Status: DISCONTINUED | OUTPATIENT
Start: 2017-01-01 | End: 2017-01-01

## 2017-01-01 RX ORDER — MORPHINE SULFATE 4 MG/ML
4 INJECTION, SOLUTION INTRAMUSCULAR; INTRAVENOUS EVERY 2 HOUR PRN
Status: DISCONTINUED | OUTPATIENT
Start: 2017-01-01 | End: 2017-01-01

## 2017-01-01 RX ORDER — POTASSIUM CHLORIDE 29.8 MG/ML
40 INJECTION INTRAVENOUS ONCE
Status: DISCONTINUED | OUTPATIENT
Start: 2017-01-01 | End: 2017-01-01

## 2017-01-01 RX ORDER — ACETAMINOPHEN 650 MG/1
650 SUPPOSITORY RECTAL EVERY 6 HOURS PRN
Status: CANCELLED | OUTPATIENT
Start: 2017-01-01

## 2017-01-01 RX ORDER — BACITRACIN 50000 [USP'U]/1
INJECTION, POWDER, LYOPHILIZED, FOR SOLUTION INTRAMUSCULAR
Status: COMPLETED
Start: 2017-01-01 | End: 2017-01-01

## 2017-01-01 RX ORDER — ENOXAPARIN SODIUM 100 MG/ML
30 INJECTION SUBCUTANEOUS DAILY
Status: DISCONTINUED | OUTPATIENT
Start: 2017-01-01 | End: 2017-01-01

## 2017-01-01 RX ORDER — FUROSEMIDE 20 MG/1
10 TABLET ORAL DAILY
Status: DISCONTINUED | OUTPATIENT
Start: 2017-01-01 | End: 2017-01-01

## 2017-01-01 RX ORDER — ONDANSETRON 2 MG/ML
4 INJECTION INTRAMUSCULAR; INTRAVENOUS EVERY 4 HOURS PRN
Status: DISCONTINUED | OUTPATIENT
Start: 2017-01-01 | End: 2017-01-01

## 2017-01-01 RX ORDER — SULFAMETHOXAZOLE AND TRIMETHOPRIM 800; 160 MG/1; MG/1
1 TABLET ORAL EVERY 12 HOURS SCHEDULED
Status: DISCONTINUED | OUTPATIENT
Start: 2017-01-01 | End: 2017-01-01

## 2017-01-01 RX ORDER — ALBUTEROL SULFATE 2.5 MG/3ML
2.5 SOLUTION RESPIRATORY (INHALATION) AS NEEDED
Status: ACTIVE | OUTPATIENT
Start: 2017-01-01 | End: 2017-01-01

## 2017-01-01 RX ORDER — METOCLOPRAMIDE HYDROCHLORIDE 5 MG/ML
10 INJECTION INTRAMUSCULAR; INTRAVENOUS AS NEEDED
Status: ACTIVE | OUTPATIENT
Start: 2017-01-01 | End: 2017-01-01

## 2017-01-01 RX ORDER — NALOXONE HYDROCHLORIDE 0.4 MG/ML
80 INJECTION, SOLUTION INTRAMUSCULAR; INTRAVENOUS; SUBCUTANEOUS AS NEEDED
Status: ACTIVE | OUTPATIENT
Start: 2017-01-01 | End: 2017-01-01

## 2017-01-01 RX ORDER — POTASSIUM CHLORIDE 20 MEQ/1
40 TABLET, EXTENDED RELEASE ORAL EVERY 4 HOURS
Status: COMPLETED | OUTPATIENT
Start: 2017-01-01 | End: 2017-01-01

## 2017-01-01 RX ORDER — SODIUM PHOSPHATE, DIBASIC AND SODIUM PHOSPHATE, MONOBASIC 7; 19 G/133ML; G/133ML
1 ENEMA RECTAL ONCE AS NEEDED
Status: ACTIVE | OUTPATIENT
Start: 2017-01-01 | End: 2017-01-01

## 2017-01-01 RX ORDER — MORPHINE SULFATE 2 MG/ML
2 INJECTION, SOLUTION INTRAMUSCULAR; INTRAVENOUS EVERY 2 HOUR PRN
Status: DISCONTINUED | OUTPATIENT
Start: 2017-01-01 | End: 2017-01-01

## 2017-01-01 RX ORDER — HEPARIN SODIUM AND DEXTROSE 10000; 5 [USP'U]/100ML; G/100ML
INJECTION INTRAVENOUS CONTINUOUS
Status: DISCONTINUED | OUTPATIENT
Start: 2017-01-01 | End: 2017-01-01

## 2017-01-01 RX ORDER — ASPIRIN 81 MG/1
324 TABLET, CHEWABLE ORAL ONCE
Status: DISCONTINUED | OUTPATIENT
Start: 2017-01-01 | End: 2017-01-01

## 2017-01-01 RX ORDER — HEPARIN SODIUM 5000 [USP'U]/ML
60 INJECTION INTRAVENOUS; SUBCUTANEOUS ONCE
Status: COMPLETED | OUTPATIENT
Start: 2017-01-01 | End: 2017-01-01

## 2017-01-01 RX ORDER — LORAZEPAM 2 MG/ML
0.5 INJECTION INTRAMUSCULAR EVERY 4 HOURS PRN
Status: DISCONTINUED | OUTPATIENT
Start: 2017-01-01 | End: 2017-01-01

## 2017-01-01 RX ORDER — LORAZEPAM 2 MG/ML
0.5 INJECTION INTRAMUSCULAR EVERY 4 HOURS PRN
Status: CANCELLED | OUTPATIENT
Start: 2017-01-01

## 2017-01-01 RX ORDER — DOBUTAMINE HYDROCHLORIDE 100 MG/100ML
INJECTION INTRAVENOUS CONTINUOUS PRN
Status: DISCONTINUED | OUTPATIENT
Start: 2017-01-01 | End: 2017-01-01

## 2017-01-01 RX ORDER — NITROGLYCERIN 20 MG/100ML
INJECTION INTRAVENOUS CONTINUOUS
Status: DISCONTINUED | OUTPATIENT
Start: 2017-01-01 | End: 2017-01-01

## 2017-01-01 RX ORDER — LORAZEPAM 2 MG/ML
2 INJECTION INTRAMUSCULAR EVERY 4 HOURS PRN
Status: CANCELLED | OUTPATIENT
Start: 2017-01-01

## 2017-01-01 RX ORDER — FUROSEMIDE 10 MG/ML
INJECTION INTRAMUSCULAR; INTRAVENOUS
Status: DISPENSED
Start: 2017-01-01 | End: 2017-01-01

## 2017-01-01 RX ORDER — ONDANSETRON 2 MG/ML
4 INJECTION INTRAMUSCULAR; INTRAVENOUS EVERY 6 HOURS PRN
Status: CANCELLED | OUTPATIENT
Start: 2017-01-01

## 2017-01-01 RX ORDER — FUROSEMIDE 10 MG/ML
40 INJECTION INTRAMUSCULAR; INTRAVENOUS ONCE
Status: COMPLETED | OUTPATIENT
Start: 2017-01-01 | End: 2017-01-01

## 2017-01-01 RX ORDER — ASPIRIN 81 MG/1
324 TABLET, CHEWABLE ORAL ONCE
Status: COMPLETED | OUTPATIENT
Start: 2017-01-01 | End: 2017-01-01

## 2017-01-01 RX ORDER — DIPHENHYDRAMINE HCL 25 MG
25 CAPSULE ORAL EVERY 6 HOURS PRN
Status: DISCONTINUED | OUTPATIENT
Start: 2017-01-01 | End: 2017-01-01

## 2017-01-01 RX ORDER — FUROSEMIDE 10 MG/ML
40 INJECTION INTRAMUSCULAR; INTRAVENOUS
Status: DISCONTINUED | OUTPATIENT
Start: 2017-01-01 | End: 2017-01-01

## 2017-01-01 RX ORDER — HYDROMORPHONE HYDROCHLORIDE 1 MG/ML
0.4 INJECTION, SOLUTION INTRAMUSCULAR; INTRAVENOUS; SUBCUTANEOUS EVERY 5 MIN PRN
Status: ACTIVE | OUTPATIENT
Start: 2017-01-01 | End: 2017-01-01

## 2017-01-01 RX ORDER — LORAZEPAM 2 MG/ML
1 INJECTION INTRAMUSCULAR EVERY 4 HOURS PRN
Status: CANCELLED | OUTPATIENT
Start: 2017-01-01

## 2017-01-01 RX ORDER — POLYETHYLENE GLYCOL 3350 17 G/17G
1 POWDER, FOR SOLUTION ORAL DAILY PRN
Status: DISCONTINUED | OUTPATIENT
Start: 2017-01-01 | End: 2017-01-01

## 2017-01-01 RX ORDER — ONDANSETRON 2 MG/ML
4 INJECTION INTRAMUSCULAR; INTRAVENOUS AS NEEDED
Status: ACTIVE | OUTPATIENT
Start: 2017-01-01 | End: 2017-01-01

## 2017-01-01 RX ORDER — POTASSIUM CHLORIDE 14.9 MG/ML
20 INJECTION INTRAVENOUS ONCE
Status: COMPLETED | OUTPATIENT
Start: 2017-01-01 | End: 2017-01-01

## 2017-01-01 RX ORDER — GLYCOPYRROLATE 0.2 MG/ML
0.2 INJECTION, SOLUTION INTRAMUSCULAR; INTRAVENOUS
Status: DISCONTINUED | OUTPATIENT
Start: 2017-01-01 | End: 2017-01-01

## 2017-01-01 RX ORDER — MORPHINE SULFATE 4 MG/ML
1 INJECTION, SOLUTION INTRAMUSCULAR; INTRAVENOUS
Status: CANCELLED | OUTPATIENT
Start: 2017-01-01

## 2017-01-01 RX ORDER — GLYCOPYRROLATE 0.2 MG/ML
0.4 INJECTION, SOLUTION INTRAMUSCULAR; INTRAVENOUS
Status: CANCELLED | OUTPATIENT
Start: 2017-01-01

## 2017-01-01 RX ORDER — HYDROMORPHONE HYDROCHLORIDE 1 MG/ML
0.5 INJECTION, SOLUTION INTRAMUSCULAR; INTRAVENOUS; SUBCUTANEOUS EVERY 30 MIN PRN
Status: DISCONTINUED | OUTPATIENT
Start: 2017-01-01 | End: 2017-01-01

## 2017-01-01 RX ORDER — LIDOCAINE HYDROCHLORIDE 10 MG/ML
INJECTION, SOLUTION INFILTRATION; PERINEURAL
Status: DISCONTINUED
Start: 2017-01-01 | End: 2017-01-01 | Stop reason: WASHOUT

## 2017-01-01 RX ORDER — CHLORHEXIDINE GLUCONATE 4 G/100ML
30 SOLUTION TOPICAL ONCE
Status: COMPLETED | OUTPATIENT
Start: 2017-01-01 | End: 2017-01-01

## 2017-01-01 RX ORDER — DIPHENHYDRAMINE HCL 25 MG
25 CAPSULE ORAL EVERY 6 HOURS PRN
Qty: 30 CAPSULE | Refills: 0 | Status: SHIPPED | OUTPATIENT
Start: 2017-01-01 | End: 2017-01-01

## 2017-01-01 RX ORDER — POTASSIUM CHLORIDE 14.9 MG/ML
20 INJECTION INTRAVENOUS ONCE
Status: DISCONTINUED | OUTPATIENT
Start: 2017-01-01 | End: 2017-01-01

## 2017-01-01 RX ORDER — TRAMADOL HYDROCHLORIDE 50 MG/1
50 TABLET ORAL EVERY 6 HOURS PRN
Status: DISCONTINUED | OUTPATIENT
Start: 2017-01-01 | End: 2017-01-01

## 2017-01-01 RX ORDER — FUROSEMIDE 20 MG/1
20 TABLET ORAL DAILY
Qty: 30 TABLET | Refills: 6 | Status: SHIPPED | OUTPATIENT
Start: 2017-01-01 | End: 2017-01-01

## 2017-01-01 RX ORDER — HYDROCODONE BITARTRATE AND ACETAMINOPHEN 5; 325 MG/1; MG/1
1 TABLET ORAL AS NEEDED
Status: ACTIVE | OUTPATIENT
Start: 2017-01-01 | End: 2017-01-01

## 2017-01-01 RX ORDER — TRAMADOL HYDROCHLORIDE 50 MG/1
TABLET ORAL
Qty: 40 TABLET | Refills: 0 | Status: SHIPPED | OUTPATIENT
Start: 2017-01-01 | End: 2017-01-01

## 2017-01-01 RX ORDER — LEVOFLOXACIN 5 MG/ML
500 INJECTION, SOLUTION INTRAVENOUS EVERY 24 HOURS
Status: DISCONTINUED | OUTPATIENT
Start: 2017-01-01 | End: 2017-01-01

## 2017-01-01 RX ORDER — HYDROCODONE BITARTRATE AND ACETAMINOPHEN 5; 325 MG/1; MG/1
2 TABLET ORAL AS NEEDED
Status: ACTIVE | OUTPATIENT
Start: 2017-01-01 | End: 2017-01-01

## 2017-01-01 RX ORDER — HEPARIN SODIUM AND DEXTROSE 10000; 5 [USP'U]/100ML; G/100ML
12 INJECTION INTRAVENOUS ONCE
Status: COMPLETED | OUTPATIENT
Start: 2017-01-01 | End: 2017-01-01

## 2017-01-01 RX ORDER — HALOPERIDOL 5 MG/ML
0.5 INJECTION INTRAMUSCULAR EVERY 4 HOURS PRN
Status: DISCONTINUED | OUTPATIENT
Start: 2017-01-01 | End: 2017-01-01

## 2017-01-01 RX ADMIN — SODIUM CHLORIDE: 9 INJECTION, SOLUTION INTRAVENOUS at 12:30:00

## 2017-01-01 RX ADMIN — SODIUM CHLORIDE: 9 INJECTION, SOLUTION INTRAVENOUS at 16:15:00

## 2017-01-01 RX ADMIN — ASPIRIN 324 MG: 81 TABLET, CHEWABLE ORAL at 12:40:00

## 2017-01-18 NOTE — ED PROVIDER NOTES
Patient Seen in: [facility] Emergency Department    History   Patient presents with:  Pain (neurologic)    Stated Complaint: pain    HPI    Patient complains of left lower back pain that radiates down the left leg.   Patient reports that while she wa Temporal   SpO2 01/18/17 0938 98 %   O2 Device 01/18/17 0938 None (Room air)       Current:/73 mmHg  Pulse 66  Temp(Src) 97.8 °F (36.6 °C) (Temporal)  Resp 16  Ht 167.6 cm (5' 6\")  Wt 55.339 kg  BMI 19.70 kg/m2  SpO2 97%        Physical Exam  Genera The following orders were created for panel order CBC WITH DIFFERENTIAL WITH PLATELET.   Procedure                               Abnormality         Status                     ---------                               -----------         ------ to conservative measures she may require more extraordinary measures such as MRI imaging, specialist consultation, and physical therapy. Patient and her friend in agreement with this plan.     Disposition and Plan     Clinical Impression:  Acute left-sided

## 2017-01-18 NOTE — ED INITIAL ASSESSMENT (HPI)
Pt states injured back when making the bed on 1/6. Sees chiropractor regularly. Adjustments helped at first, but back isnt getting better.   Pt states pain to left side from shoulder to lower back is getting worse

## 2017-01-30 PROBLEM — G89.29 CHRONIC BILATERAL LOW BACK PAIN WITHOUT SCIATICA: Status: ACTIVE | Noted: 2017-01-01

## 2017-01-30 PROBLEM — R01.1 HEART MURMUR: Status: ACTIVE | Noted: 2017-01-01

## 2017-01-30 PROBLEM — M54.50 CHRONIC BILATERAL LOW BACK PAIN WITHOUT SCIATICA: Status: ACTIVE | Noted: 2017-01-01

## 2017-02-03 NOTE — PROGRESS NOTES
SPINE EVALUATION:   Referring Physician: Dr. Minh Lomeli  Diagnosis: chronic BL low back pain without sciatica     Date of Service: 2/3/2017     PATIENT SUMMARY   Doc Keyes is a 80year old y/o female who presents to therapy today with complaints activity tolerance up. Litzy Potts would benefit from skilled Physical Therapy to address the above impairments to return to prior level of function.     Precautions:  Fall Risk  OBJECTIVE:   Observation/Posture: poor, slumped  Gait: slow with cane on R, decrea Potential:good    FOTO: 34/100  Current status G Code: [id number] Mobility: walking and moving around functional limitation CL  Goal status G Code:  Mobility: walking and moving around functional limitation CK    Patient/Family/Caregiver was advised of the

## 2017-02-07 NOTE — PROGRESS NOTES
Dx: chronic BL low back pain without sciatica         Authorized # of Visits:           Next MD visit: none scheduled  Fall Risk: standard         Precautions: n/a             Subjective: Patient reports that she felt fine waking up this morning, but it st

## 2017-02-10 NOTE — PROGRESS NOTES
Dx: chronic BL low back pain without sciatica         Authorized # of Visits:           Next MD visit: none scheduled  Fall Risk: standard         Precautions: n/a             Subjective: Patient reports that she felt fine waking up this morning, but it st Standing hip abduction 2x10 BL                          Skilled Services: HEP in bold.     Charges: manual x1, therex x1, neuro x1       Total Timed Treatment: 45 min  Total Treatment Time: 45 min

## 2017-02-14 NOTE — PROGRESS NOTES
Dx: chronic BL low back pain without sciatica         Authorized # of Visits:           Next MD visit: none scheduled  Fall Risk: standard         Precautions: n/a             Subjective: Patient reports that she has not been able to sleep very well at all abduction 2x10 BL         Supine SLR 2x10 BL         Seated lumbar flexion with SB 3x10         Seated lumbar flexion stretch x10       Skilled Services: HEP in bold.     Charges: manual x1, therex x2      Total Timed Treatment: 45 min  Total Treatment Time

## 2017-02-24 PROBLEM — M54.16 LUMBAR RADICULITIS: Status: ACTIVE | Noted: 2017-01-01

## 2017-02-24 PROBLEM — S32.030A COMPRESSION FRACTURE OF L3 LUMBAR VERTEBRA, CLOSED, INITIAL ENCOUNTER (HCC): Status: ACTIVE | Noted: 2017-01-01

## 2017-02-24 PROBLEM — M48.061 LUMBAR STENOSIS: Status: ACTIVE | Noted: 2017-01-01

## 2017-03-17 PROBLEM — I35.0 NONRHEUMATIC AORTIC VALVE STENOSIS: Status: ACTIVE | Noted: 2017-01-01

## 2017-04-21 NOTE — PROGRESS NOTES
Post procedure note Dr. Alva Kenny. Left  Left and right heart catheterization bilateral selective coronary angiogram.  No LV gram was performed. Right iliofemoral angiogram Perclose closure. Preliminary report:  -Mild proximal LAD and small diagonal disease.   L

## 2017-04-21 NOTE — H&P
JESSI and P    Kylah Guajardo is a 80year old female. Patient presents with:  Consult: , heart murmur          ASSESSMENT/PLAN:      Harsh systolic ejection murmur consistent with probable aortic stenosis.  Transmitted apical murmur and murmu    retired                                     Social History Main Topics    Smoking Status: Never Smoker                           Allergies:  No Known Allergies    Current Meds:    Current Outpatient Prescriptions:  HYDROcodone-acetaminophen 5-325 MG Ora rebound, BS-present. Extremities: Without clubbing, cyanosis or edema.  Peripheral pulses are 2+. Neurologic: Alert and oriented, normal affect. No motor or coordinational deficit. Skin: Warm and dry.

## 2017-04-24 NOTE — PROCEDURES
[facility]    PATIENT'S NAME: Evaristo Dial   ATTENDING PHYSICIAN: Billy Amato M.D. OPERATING PHYSICIAN: Alaina Valdez.  Kobe Alfaro MD   PATIENT ACCOUNT#:   [id number]    LOCATION:  [facility] 10 EDWP 10  MEDICAL RECORD #:   [id number]       DATE OF dominant vessel with a moderate PDA and a moderate posterolateral system. There is moderate diffuse disease. AORTIC ROOT ANGIOGRAPHY:  A shallow Japanese caudal is a reasonable isoplanar view.       RIGHT ILIAC ANGIOGRAPHY:  The right iliac is adequate paramjit

## 2017-04-26 PROBLEM — Z91.81 AT RISK FOR FALLING: Status: ACTIVE | Noted: 2017-01-01

## 2017-04-26 PROBLEM — L03.119 CELLULITIS OF LEG: Status: ACTIVE | Noted: 2017-01-01

## 2017-04-26 NOTE — PROGRESS NOTES
Pt. Received as direct admit to 8611 from Dr. Acuña Gift office for cellulitis. Pt. Alertx4, calm, cooperative, resting in bed comfortably. Braden Manjarrez, at bedside. VSS, BP on higher side. Pt. Saturating well on RA. RFA IV placed and flushing well.  Dr. Santi Frazier

## 2017-04-26 NOTE — CONSULTS
[facility]  Consultation Note      Burnice Coup Patient Status:  Inpatient    1926 MRN [id number]   [facility] 8NE-A Attending [facility]   Hosp Day # 0 PCP Sloan Jimenez MD     Reason Aortic valve stenosis    • [place] (hard of hearing)    • Spinal stenosis    • Back pain            Past Surgical History    OTHER      Comment appe    OTHER      Comment breast biopsy 79       Family History  family history is not on file.     Social History Sinus jerry with [facility]    Labs:   No results found for: PT, INR            Thank you for allowing our practice to participate in the care of your patient. Please do not hesitate to contact me if you have any questions.     Ramón Meza MD, [facility]  4/26/

## 2017-04-27 NOTE — H&P
[facility] Hospitalist H&P       CC: No chief complaint on file.        PCP: Lyudmila Cristina MD    History of Present Illness:  Patient is a 80year old female with PMH sig for severe AS with plans for TAVR next Tuesday presented from clinic for treatment of Systems  Comprehensive ROS reviewed and negative except for what's stated above.        OBJECTIVE:  /59 mmHg  Pulse 61  Temp(Src) 98 °F (36.7 °C) (Oral)  Resp 18  Ht 167.6 cm (5' 6\")  Wt 126 lb (57.153 kg)  BMI 20.35 kg/m2  SpO2 98%  PE:  Gen: alert Peak Systolic Velocity:  50.23 cm/s Right Distal ICA Peak Systolic Velocity:  90.97 cm/s Right Carotid Bulb Peak Systolic Velocity  52.78 cm/s Right ICA/CCA Velocity Ratio:  0.75  Left CCA Peak Systolic Velocity  98.38 cm/s Left Proximal ICA Peak Systolic abdominal aorta and iliac arteries. There is no evidence of abdominal aortic aneurysm. There is moderate tortuosity/ectasia of the abdominal aorta. There is mild atherosclerotic narrowing of the  origin of the celiac axis due to calcified plaque.  There is in the spine and sacroiliac joints. LUNG BASES:  Mild scarring/atelectasis in the lower lungs. OTHER:  Negative. RIGHT Femoral Artery:       Tortuosity:      Mild      Calcification:   Mild to moderate      Minimum luminal diameters:           Common kailyn The heart rate at the time of contrast injection was 68 beats per minute. The cardiac rhythm was normal sinus rhythm. The images reveal normal anatomic relationships between the atria, ventricles, and great vessels. Image quality was excellent.   No art left common carotid artery, and left subclavian artery. DESCENDING AORTA:   The descending thoracic aorta is normal in size. There is no calcification of the descending thoracic aorta.   LEFT MAIN CORONARY ARTERY (LM): The left main coronary artery originat annulus. 6. Please refer to the radiologist's interpretation of non-cardiac structures Approved by: Mario Hopson MD      ADDENDUM:  Please correlate with above cardiology report regarding vascular/angiographic findings.   The nonspecific reticular heart rate at the time of contrast injection was 68 beats per minute. The cardiac rhythm was normal sinus rhythm. The images reveal normal anatomic relationships between the atria, ventricles, and great vessels. Image quality was excellent.   No artifact common carotid artery, and left subclavian artery. DESCENDING AORTA:   The descending thoracic aorta is normal in size. There is no calcification of the descending thoracic aorta.   LEFT MAIN CORONARY ARTERY (LM): The left main coronary artery originates no Please refer to the radiologist's interpretation of non-cardiac structures Approved by: Jasmin Kendall MD    .            ASSESSMENT / PLAN:     # B/l LE cellulitis?  - concern for venous stasis. ?superimposed cellulitis.  Staff reporting some mild i

## 2017-04-27 NOTE — PHYSICAL THERAPY NOTE
PHYSICAL THERAPY EVALUATION - INPATIENT     Room Number: 3308/5918-L  Evaluation Date: 4/27/2017  Type of Evaluation: Initial  Physician Order: PT Eval and Treat    Presenting Problem: cellulitis, CHF  Reason for Therapy: Mobility Dysfunction and Disch 0          COGNITION  · Overall Cognitive Status:  WFL - within functional limits    RANGE OF MOTION AND STRENGTH ASSESSMENT  Upper extremity ROM and strength are within functional limits except for the following:  pt with left shoulder flexion to approx 7 Within Functional Limits  Stoop/Curb Assistance: Not tested       Skilled Therapy Provided: Pt seated in chair, agreeable to PT eval. Pt completes sit to stand with supervision, gait x 150 ft with single point cane, no LOB noted.  Stands at length to talk w recommending [facility] PT for home safety eval.    DISCHARGE RECOMMENDATIONS  PT Discharge Recommendations: Home with home health PT    PLAN  PT Treatment Plan: Balance training;Strengthening;Gait training  Rehab Potential : Good  Frequency (Obs): 5x/week  Number

## 2017-04-27 NOTE — CM/SW NOTE
BPCI:  Met with patient and friend Andrew Riley at bedside to explain the BPCI/Medicare program. Patient agreed with phone f/u for 3 months from [employer] [facility] after discharge from [facility]. Patient was enrolled under .  BPCI/Medicare Letter and Brochure provi

## 2017-04-27 NOTE — HOME CARE LIAISON
Referral received for [facility]. Met with pt to discuss home health services and coverage criteria. Pt agreeable to [facility]. Agency brochure provided. Referral placed via [facility].  [facility] has accepted pt and will provide skilled nurse and physical thera

## 2017-04-27 NOTE — PLAN OF CARE
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

## 2017-04-27 NOTE — PLAN OF CARE
Impaired Functional Mobility    • Achieve highest/safest level of mobility/gait Progressing        Patient/Family Goals    • Patient/Family Long Term Goal Progressing    • Patient/Family Short Term Goal Progressing        RISK FOR INFECTION - ADULT    • Ab

## 2017-04-27 NOTE — PROGRESS NOTES
[facility] Cardiology  Progress Note    Jan Trejo Patient Status:  Inpatient    1926 MRN [id number]   [facility] 8NE-A Attending Edmond Cain MD   Hosp Day # 1 PCP Mercedes Sicard, MD     Impression:  1.  Sever Oral Q6H PRN   docusate sodium (COLACE) cap 100 mg 100 mg Oral BID   PEG 3350 (MIRALAX) powder packet 17 g 17 g Oral Daily PRN   magnesium hydroxide (MILK OF MAGNESIA) 400 MG/5ML suspension 30 mL 30 mL Oral Daily PRN   bisacodyl (DULCOLAX) rectal supposito

## 2017-04-27 NOTE — CM/SW NOTE
04/27/17 1200   CM/SW Screening   Referral Source    Patient's Mental Status Alert;Oriented   Patient's [address]   Number of Levels in Home 1   Patient lives with Alone   Patient Status Prior to Admission   Independent with ADL

## 2017-04-27 NOTE — CONSULTS
INFECTIOUS DISEASE CONSULT NOTE    Robby Oakley Patient Status:  Inpatient    1926 MRN [id number]   [facility] 8NE-A Attending Cheyenne Chen MD   [address] [facility] Day # 1 PCP Joey Rosario sodium (COLACE) cap 100 mg, 100 mg, Oral, BID  •  PEG 3350 (MIRALAX) powder packet 17 g, 17 g, Oral, Daily PRN  •  magnesium hydroxide (MILK OF MAGNESIA) 400 MG/5ML suspension 30 mL, 30 mL, Oral, Daily PRN  •  bisacodyl (DULCOLAX) rectal suppository 10 mg, motion of all extremities. 2-3 + edema legs  Integument: + erythema of both pretibial areas, from ankles to several cm below the knee. No open wounds. No warmth.  Erythema seems a little better with leg elevation    Laboratory Data:    Recent Labs   Lab  0

## 2017-04-28 NOTE — PHYSICAL THERAPY NOTE
PHYSICAL THERAPY TREATMENT NOTE - INPATIENT    Room Number: 7341/9212-N     Session: 1  Number of Visits to Meet Established Goals: 3    Presenting Problem: cellulitis    Problem List  Active Problems:     At risk for falling    Cellulitis of leg      Past independent  Distance (ft): 150  Assistive Device: Cane  Pattern: Within Functional Limits (decreased tyler)  Stoop/Curb Assistance: Not tested       Skilled Therapy Provided: Pt seated in chair, agreeable to PT session.  Pt [place], often needs commands repe PLAN  PT Treatment Plan: Balance training;Strengthening;Gait training  Rehab Potential : Good  Frequency (Obs): 5x/week    CURRENT GOALS      Goal #1  Pt will be independent with HEP for standing balance ex.   MET   Goal #2  Patient is able to [place]

## 2017-04-28 NOTE — PLAN OF CARE
RISK FOR INFECTION - ADULT    • Absence of fever/infection during anticipated neutropenic period Progressing        SKIN/TISSUE INTEGRITY - ADULT    • Skin integrity remains intact Progressing

## 2017-04-28 NOTE — PROGRESS NOTES
Cards/TAVR:    Doing well. No complaints. No SOB, KANG, CP. Antibiotics discontinued. MEDS: Reviewed. Lasix 20mg IV BID.       Intake/Output Summary (Last 24 hours) at 04/28/17 1044  Last data filed at 04/28/17 0800   Gross per 24 hour   Intake

## 2017-04-28 NOTE — PROGRESS NOTES
[address] [facility]  TEL: [phone]  FAX: [phone]    Doc Keyes Patient Status:  Inpatient    1926 MRN [id number]   [facility] 8NE-A Attending Carla Beckman MD   Hosp Day # 2 PCP Vijay Mcqueen against cellulitis.  I think the LE erythema is stasis related              - cont leg elevation as possible              - diuresis per cardiology              - compression              - cont topical steroids              - monitor legs clinically, follo

## 2017-04-28 NOTE — PROGRESS NOTES
[facility] hospitalist daily note  Patient was seen/examined on 4/28/17    S; legs feel better, not itching,  but still red and swollen    Medications in EPIC    PE;    04/28/17  1231   BP:    Pulse:    Temp: 97.9 °F (36.6 °C)   Resp: 20     Gen: awake, alert

## 2017-04-29 NOTE — PROGRESS NOTES
[facility]  Cardiology Progress Note    Chula Ocasio Patient Status:  Inpatient    1926 MRN [id number]   [facility] [place] 8NE-A Attending Nel Sandoval MD   [facility] Day # 3 PCP Irina Luke MD       Assessment and Plan:  Jennifer Steele dry.     Laboratory/Data:    Labs:         Recent Labs   Lab  04/26/17 1857 04/27/17   0521   WBC  4.1  3.8*   HGB  9.8*  9.6*   MCV  94.1  94.4   PLT  185.0  185.0       Recent Labs   Lab  04/26/17 1857 04/27/17   0521  04/28/17   0529   NA  139  14

## 2017-04-29 NOTE — PROGRESS NOTES
[address] [facility]  TEL: [phone]  FAX: [phone]    Ana Santos Patient Status:  Inpatient    1926 MRN [id number]   [facility] 8NE-A Attending Apple Estevez MD   Hosp Day # 3 PCP Lonnie Matias insufficiency)              - no fevers or systemic signs of infection. Wbc is nl and involvement is bilateral and symmetric.  Blanching redness and decrease with leg elevation not consistent with cellulitis but compatible with venous insufficiency

## 2017-04-29 NOTE — PROGRESS NOTES
[facility] hospitalist daily note  Patient was seen/examined on 4/29/17    S; legs feel better, not itching, but still red .  No DVT on venous doppler      Medications in EPIC    PE;    04/29/17  1649   BP: 111/78   Pulse: 60

## 2017-04-30 NOTE — PROGRESS NOTES
[facility] hospitalist daily note  Patient was seen/examined on 4/30/17    S; legs feel better, but still red, she feels better with steroid cream No DVT on venous doppler      Medications in EPIC    PE;    04/30/17  08

## 2017-04-30 NOTE — PROGRESS NOTES
Cards/TAVR:    Doing well. Legs itch. But otherwise no complaints. Breathing ok. No CP. MEDS: Reviewed.      04/30/17  0830   BP: 112/50   Pulse: 65   Temp: 98 °F (36.7 °C)   Resp: 18     Physical Exam:   /50 mmHg  Pulse 65  Temp(Src) 9

## 2017-05-01 NOTE — PROGRESS NOTES
Cards:    Doing well. No complaints. No SOB, CP. MEDS: Reviewed.      05/01/17  0912   BP: 113/53   Pulse:    Temp:    Resp: 18       Intake/Output Summary (Last 24 hours) at 05/01/17 0919  Last data filed at 05/01/17 0413   Gross per 24 hour   Int

## 2017-05-01 NOTE — ANESTHESIA PREPROCEDURE EVALUATION
PRE-OP EVALUATION    Patient Name: Harmony Lai    Pre-op Diagnosis: aortic stenosis    Procedure(s):  transcatheter aortic valve replacement,  right femoral percutaneous approach, 26mm    Surgeon(s) and Role:     * Leah Pryor MD - Primary     * tablet by mouth every 4-6 hours as needed for pain Disp: 40 tablet Rfl: 0   mineral oil Oral Oil Take by mouth daily as needed for constipation. Disp:  Rfl:    latanoprost 0.005 % Ophthalmic Solution Place 1 drop into both eyes nightly.  Disp:  Rfl: reviewed. (+) valvular problems/murmurs and AS and MR                       Endo/Other    Negative endo/other ROS. Pulmonary    Negative pulmonary ROS.                        Neuro/Psych                 ( accepted.   Plan/risks discussed with: patient  Use of blood product(s) discussed with: patient              Present on Admission:   **None**

## 2017-05-01 NOTE — CONSULTS
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

## 2017-05-01 NOTE — PROGRESS NOTES
[address] [facility]  TEL: [phone]  FAX: [phone]    Harmony Bidding Patient Status:  Inpatient    1926 MRN [id number]   [facility] [place] 8NE-A Attending Ravi Burleson MD   [facility] Day # 5 OZ Cannon preop TVAR    PATIENT STATED HISTORY: (As transcribed by Technologist) Pre-operative TAVR, scheduled for 05/02/17. FINDINGS: The lungs are clear. There is hyperaeration noted diffusely. The cardiac silhouette is borderline for enlargement.  Normal pulmon

## 2017-05-01 NOTE — PROGRESS NOTES
[facility] hospitalist daily note  Patient was seen/examined on 5/1/17    S; legs feel and look better      Medications in EPIC    PE;    05/01/17  1611   BP:    Pulse:    Temp:    Resp: 18     Gen: awake, alert, no acute distress

## 2017-05-01 NOTE — DIETARY MALNUTRITION NOTE
[facility]    NUTRITION INITIAL ASSESSMENT    Pt meets malnutrition criteria.     NUTRITION DIAGNOSIS/PROBLEM:    Malnutrition related to physiological causes increasing nutrient needs as evidenced by BMI <18.5, weight loss 17# (13%) in < 3 months, mus No  Cultural/Ethnic/Synagogue Preferences Addresses: Yes    NUTRITION RELATED PHYSICAL FINDINGS:     1. Body Fat/Muscle Mass: BMI 17.63, temporal depression, very little space between folds in upper arm     2.  Fluid Accumulation: not noted    NUTRITION PRE

## 2017-05-02 NOTE — PROCEDURES
PROCEDURE PERFORMED: Transcatheter aortic valve replacement, 26 mm Knowles Nidia 3 aortic valve, right  transfemoral approach percutaneously. OPERATORS:   1. Cardiovascular surgeon, Dr. Camilo Gustafson.   2. Interventional Cardiology, Dr. Kylah Kay Dr.

## 2017-05-02 NOTE — PROGRESS NOTES
[facility] hospitalist daily note  Patient was seen/examined on 5/2/17    S; legs feel and look better, awaiting TAVR      Medications in EPIC    PE;    05/02/17  0827   BP: 142/64   Pulse: 58   Temp:    Resp: 20     Gen: awake, alert, no acute distress  HEENT; m

## 2017-05-02 NOTE — PROGRESS NOTES
TAVR:    Doing well post procedure. No complaints. Breathing ok. Groin stable. Plan to OOB to chair tonight. Lines out later tonight.     Kylah Kay MD

## 2017-05-02 NOTE — OPERATIVE REPORT
[facility]    PATIENT'S NAME: Rani Gomez   ATTENDING PHYSICIAN: Amauri Arias M.D. OPERATING PHYSICIAN: Jasmeet Paez.  Mahamed Pham MD   PATIENT ACCOUNT#:   [id number]    LOCATION:  [address] [place] [address]  MEDICAL RECORD #:   [id number]       DATE OF BIRTH: deployed while rapid pacing at 160 beats per minute. We evaluated it with transesophageal echo and subsequently an aortogram.  We had only trace insufficiency. We therefore ended that portion of the case and removed the 14-Ugandan sheath over a J-wire.   Thamas Necessary

## 2017-05-02 NOTE — PLAN OF CARE
1320-Admitted to Rm 6600 s/p TAVR, portia. Groins CDI, soft, no hematoma,TEDs & SCDS applied, keagan hathaway, HIGINIO per protocol, SBP>170, Nitro gtt started & titrated to maintain SBP< 140's. SB in 50's per monitor. Layne Craig in to see.   1445-More awake, MARTINA,

## 2017-05-02 NOTE — ANESTHESIA POSTPROCEDURE EVALUATION
[address] [facility] Patient Status:  Inpatient   Age/Gender 80year old female MRN [id number]   [facility] 6NE-A Attending Antonia Hanson, [address] [facility] [address] Day # 6 PCP Carie Sharpe MD       Anesthesia Post-op Note    Procedur

## 2017-05-02 NOTE — PROGRESS NOTES
[address] [facility]  TEL: [phone]  FAX: [phone]    Chula Ocasoi Patient Status:  Inpatient    1926 MRN [id number]   [facility] 8NE-A Attending Nel Sandoval MD   [facility] Day # 6 OZ Crooks infection. Wbc is nl and involvement is bilateral and symmetric. She also reported LE pruritus. All these factors speak against cellulitis.  I think the LE erythema is stasis related   - now much better with leg elevation, diuresis, compression and topical

## 2017-05-02 NOTE — OPERATIVE REPORT
Cardiovascular Surgery Operative Note  TAVR        INDICATIONS:         80year old with symptomatic aortic stenosis  Pt seen by Dr Rosalba Joseph, Dr Rhett Alvarado and myself  STS Risk Score: 3.7 %   Euroscore: 5.3 %  The assessment is that the patient is at inte

## 2017-05-02 NOTE — PROCEDURES
[facility] [address] [place] [address] Cardiology  Transesophageal Echocardiogram Report    PREOPERATIVE DIAGNOSIS:   Severe aortic stenosis    POSTOPERATIVE DIAGNOSIS:  Transcatheter aortic valve replacement    PROCEDURE PERFORMED: Transesophageal echocardiogram with Doppl transfemoral approach. Preprocedure severe aortic stenosis, mean gradient 49 mmHg. Postprocedure, normally functioning Nidia 3 bioprosthetic valve with a mean gradient of 5 mmHg. No paravalvular aortic regurgitation (by VARC-2 criteria) was noted.     Suj

## 2017-05-03 NOTE — CM/SW NOTE
SHELBY met with pt to assess for dc needs post TAVR. Pt is 81 yo female. She lives alone in her own ranch style home. She has cane and walker. Pt has home delivered meals Monday through Friday. She has no family locally.   She depends on friends for her dri

## 2017-05-03 NOTE — PHYSICAL THERAPY NOTE
PHYSICAL THERAPY EVALUATION - INPATIENT     Room Number: 9276  Evaluation Date: 5/3/17  Type of Evaluation: Re-evaluation  Physician Order: PT Eval and Treat    Presenting Problem: cellulitis, s/p TAVR 5/2/17  Reason for Therapy: Mobility Dysfunction a functional limits    RANGE OF MOTION AND STRENGTH ASSESSMENT  Upper extremity ROM and strength are within functional limits except for the following:  pt with left shoulder flexion to approx 70 deg due to left arm paralysis since birth, left shoulder stren walker  Pattern: R Foot flat;L Foot flat  Stoop/Curb Assistance: Not tested       Skilled Therapy Provided: Pt received sitting up in bedside chair and is agreeable to therapy. Pt sit-stand with cane and supervision.  Pt ambulated a few feet with cane and C in order to return to independent prior level of function. DISCHARGE RECOMMENDATIONS  PT Discharge Recommendations: Home with home health PT    PLAN  PT Treatment Plan: Bed mobility; Endurance; Energy conservation;Patient education;Gait training;Strength

## 2017-05-03 NOTE — PROGRESS NOTES
[address] [place] [address]  TEL: [phone]  FAX: [phone]    [facility] Patient Status:  Inpatient    1926 MRN [id number]   [facility] 8NE-A Attending Maria Victoria Mccormick MD   [facility] Day # 7 OZ [place] PCT neg    Microbiology    Reviewed in EMR,   [address]    Radiology:   reviewed     ASSESSMENT/ PLAN:    1.  Post op fevers- pt non toxic and PCT is neg, making bacterial infection less likely   -legs are erythematous but not markedly worse, I still fee

## 2017-05-03 NOTE — PROGRESS NOTES
TAVR:    Fever 101.5 last night. But no complaints. Ambulating. No groin issues.      05/03/17  0900   BP: 122/67   Pulse: 68   Temp:    Resp: 15     Physical Exam:   /67 mmHg  Pulse 68  Temp(Src) 101.2 °F (38.4 °C) (Temporal)  Resp 15  Ht 5'

## 2017-05-03 NOTE — PROGRESS NOTES
Assumed care of pt. At 1700. Denies c/o pain, malaise, or cardiac symptoms. Tolerating Merepenum well with no adverse effects. Lungs with crackles in the bases. On RA saturating at 96%. Ambulating the halls with standby and a walker.   Abdomen soft and

## 2017-05-03 NOTE — PLAN OF CARE
Assumed care of pt 1930. Pt up in chair and free of signs of distress. Pt states she is free of pain. Pt restless and did not sleep well during the night. A-line and hathaway d/c. Pt transfers from chair to bed with stable gait.

## 2017-05-03 NOTE — OCCUPATIONAL THERAPY NOTE
OCCUPATIONAL THERAPY QUICK EVALUATION - INPATIENT    Room Number: 2591/9205-W  Evaluation Date: 5/3/2017     Type of Evaluation: Initial  Presenting Problem: TAVR    Physician Order: IP Consult to Occupational Therapy  Reason for Therapy:  ADL/IADL Dysfunc COGNITION  WNL    RANGE OF MOTION AND STRENGTH ASSESSMENT  Upper extremity ROM is within functional limits     Upper extremity strength is within functional limits     NEUROLOGICAL FINDINGS                   ACTIVITIES OF DAILY LIVING ASSESSMENT  AM-PA Please re-order if a new functional limitation presents during this admission.     Patient was able to achieve the following goals:  Patient able to toilet transfer: safely and independently  Patient able to dress lower extremities: safely and independently

## 2017-05-03 NOTE — PLAN OF CARE
Pt to transfer to room 8626. Report called to [facility] MAHAMED. Pt moved by wheelchair with tele and IV.

## 2017-05-03 NOTE — PROGRESS NOTES
[facility] Pharmacy Note:  Renal Adjustment for cefepime    Jaelyn Davis is a 80year old female who has been prescribed  Cefepime 1000 mg every 8 hrs. CrCl is estimated creatinine clearance is 47.7 mL/min (based on Cr of 0.6).  so the dose has been adjus

## 2017-05-03 NOTE — PROGRESS NOTES
[facility] Pharmacy Note: Renal dose adjustment for Famotidine (Pepcid)  Monse Patrick has been prescribed Famotidine (Pepcid) 20 mg every 12 hours. Estimated Creatinine Clearance: 33.3 mL/min (based on Cr of 0.86).     Her calculated creatinine c

## 2017-05-03 NOTE — OPERATIVE REPORT
[facility]    PATIENT'S NAME: Farhad Springer   ATTENDING PHYSICIAN: Adolfo Lala M.D. OPERATING PHYSICIAN: Noman Olsen M.D.    PATIENT ACCOUNT#:   [de-identified]    [facility]  MEDICAL RECORD #:   [id number]       DATE OF TIMO

## 2017-05-04 NOTE — CM/SW NOTE
SW met with Pt and her neighbor Andrew Riley earlier today to discuss d/c plans. PT had recommended HHC. [facility] had been arranged and will also provide RNA emergency response services.  Pt was assessed by DCSS worker for interim services and found to be

## 2017-05-04 NOTE — PROGRESS NOTES
[address] [facility]  TEL: [phone]  FAX: [phone]    Mark Anthony Vee Patient Status:  Inpatient    1926 MRN [id number]   [facility] 8NE-A Attending Garrett Montiel MD   [facility] Day # 8 PCP Tuan Wray neg    Recent Labs   Lab  05/01/17   1829  05/03/17   1534   COLORUR  Yellow  Yellow   CLARITY  Clear  Clear   SPECGRAVITY  1.019  1.013   GLUUR  Negative  Negative   BILUR  Negative  Negative   KETUR  Negative  Negative   BLOODURINE  Small*  Negative   PH steroids              - monitor legs clinically, follow temps and wbc    4. Severe AS- s/p TAVR on 5/2    5. Acute on chronic diastolic HF- LE edema markedly better. cardiology following    6. Heart block- for pacemaker today.  SINCE POSSIBLE RASH TO CEPHAL

## 2017-05-04 NOTE — HOME CARE LIAISON
Pt requested Residential Nurse Alert. Pt registered and provided with same. Instructions given to friend due to pt was being prepped for pacer insertion.      Joane Kehr  [phone]  Pager Lolita [phone]

## 2017-05-04 NOTE — PROGRESS NOTES
[facility] hospitalist daily note  Patient was seen/examined on 5/2/17    S; no chest pain or SOB during my visit.  No nausea, was febrile today    Medications in EPIC    PE:    05/03/17  1644   BP: 111/60   Pulse: 75   Temp: 98.4 °F (36.9 °C)   Resp: 20     Ge

## 2017-05-04 NOTE — CONSULTS
[facility] dosing service    Initial Pharmacokinetic Consult for Vancomycin Dosing     Monse Patrick is a 80year old female admitted on 5/2 who has a procedure for pacemaker implantantion.  Pharmacy has been asked to dose Vancomycin by Dr. Altagracia Flower

## 2017-05-04 NOTE — PROCEDURES
Pacemaker Implantation    History:  80year old female with CHB post-TAVR > 48h who presents for a DCPPM implant.  The risks and benefits of the procedure (including, but not limited to, hematoma, infection, pneumothorax, tamponade, renal failure from IV dy Pacing   Generator Medtronic [id number]      RA ( Medtronic [id number] 1.8 497 1.0 @ 0.5ms   RV ( Medtronic [id number] 8.8 1078 0.6 @ 0.5ms                           Conclusions:  · Successful dual chamber pacemaker implant  · Adequate RA

## 2017-05-04 NOTE — PHYSICAL THERAPY NOTE
PHYSICAL THERAPY TREATMENT NOTE - INPATIENT    Room Number: 1567/3588-N     Session: 1    Number of Visits to Meet Established Goals: 3    Presenting Problem: cellulitis, s/p TAVR 5/2/17    Problem List  Active Problems:     At risk for falling    Cellulit CI    FUNCTIONAL ABILITY STATUS  Gait Assessment   Gait Assistance: Modified independent  Distance (ft): 500  Assistive Device: Rolling walker  Pattern: Within Functional Limits (excessive kyphosis)  Stoop/Curb Assistance: Not tested       Skilled Therapy mobility; Endurance; Energy conservation;Patient education;Gait training;Strengthening;Transfer training;Balance training  Rehab Potential : Good  Frequency (Obs): 5x/week       CURRENT GOALS      Goal #1  Pt will perform supine-sit with MOD I.       Goal #2

## 2017-05-04 NOTE — PROGRESS NOTES
[facility] Hospitalist Progress Note                                                                   [address] [facility]  11/23/1926    CC: F/U s/p TAVR    SUBJECTIVE:    Pt seen this NAHED. Dimple latanoprost  1 drop Both Eyes Nightly   • docusate sodium  100 mg Oral BID     Continuous Infusions:   • DOBUTamine in D5W     • norepinephrine     • nitroprusside (NIPRIDE) 50 mg in D5W infusion     • insulin regular     • EPINEPHrine (ADRENALIN) infusion

## 2017-05-04 NOTE — CONSULTS
I was asked by Dr. Kris Lui to evaluate for CHB    80year old female with PMhx of critical AS with baseline LBBB on ECG who was directly admitted with TAVR which was successfully performed 48 hours ago.   Today noted to have CHB on the floor (no ECG available) 1048   WBC  5.6   HGB  9.8*   PLT  147.0*   PTT  32.4   INR  1.16*       Chem:  Recent Labs   Lab  05/02/17   0433  05/03/17   1048  05/04/17   0429   NA   --   140   --    K  4.0  3.5*  3.9   CL   --   106   --    CO2   --   26.0   --    BUN   --   26* complication rate than usual due to her advanced age, recent procedure and urgent case. She understands and agrees to proceed.   The risks and benefits of the procedure (including, but not limited to, hematoma, infection, pneumothorax, tamponade, renal elisabeth

## 2017-05-04 NOTE — PROGRESS NOTES
[facility] Cardiology  Progress Note    Parveen Diss Patient Status:  Inpatient    1926 MRN [id number]   [facility] 8NE-A Attending Sebastián Mae MD   Hosp Day # 8 PCP Honey Gray MD     Impression:  1.  Sasha are appreciated  Lungs: Clear to auscultation bilaterally; no accessory muscle use  Abdomen: Soft, non-tender; bowel sounds are normoactive  Extremities: No clubbing/cyanosis; moves all 4 extremities normally      Current Facility-Administered Medications: acetaminophen (TYLENOL) tab 650 mg 650 mg Oral Q6H PRN   docusate sodium (COLACE) cap 100 mg 100 mg Oral BID   magnesium hydroxide (MILK OF MAGNESIA) 400 MG/5ML suspension 30 mL 30 mL Oral Daily PRN   bisacodyl (DULCOLAX) rectal suppository 10 mg 10 mg R

## 2017-05-04 NOTE — PLAN OF CARE
Patient noted to have evidence of transient 3rd degree AVB on telemetry. D/C cancelled. Will ask for EP consultation re: pacemaker.      Katherleen Hodgkin MD, [facility]  5/4/2017  2:32 PM

## 2017-05-05 NOTE — PHYSICAL THERAPY NOTE
PHYSICAL THERAPY QUICK EVALUATION - INPATIENT    Room Number: 4915/5329-A  Evaluation Date: 5/5/2017  Presenting Problem: s/p TAVR 5/2/17, s/p Pacer 5/4/17  Physician Order: PT Eval and Treat    Problem List  Active Problems:     At risk for falling    Cell Extension: WFL - Within Functional Limits    LUE PROM     LUE Strength  Not tested due to pacer precautions   RLE ASSESSMENT   RLE AROM  R Hip Flexion: 3/4 - Full ROM   R Knee Flexion: 3/4 - Full ROM   R Knee Extension: 3/4 - Full ROM   R Ankle Dorsiflexio or accomplishes a change in speed with significant gait deviations, or changes speed but has significant gait deviations, or changes speed but loses balance but is able to recover and continue walking.  (0)  Severe Impairment: Cannot change speeds, or lose patient currently need. ..   -   Moving to and from a bed to a chair (including a wheelchair)?: None   -   Need to walk in hospital room?: None   -   Climbing 3-5 steps with a railing?: A Little       AM-PAC Score:  Raw Score: 23   PT Approx Degree of Impai physical therapy. Recommend HOME PT for home safety evaluation and further balance training. GOALS  Patient was able to achieve the following goals . ..     Patient was able to transfer Safely and independently   Patient able to ambulate on level surface

## 2017-05-05 NOTE — PROGRESS NOTES
Cards:    Doing well. Post PPM.    No complaints. MEDS: Reviewed.      05/05/17  0802   BP: 124/68   Pulse: 65   Temp: 98.2 °F (36.8 °C)   Resp: 20     Physical Exam:   /68 mmHg  Pulse 65  Temp(Src) 98.2 °F (36.8 °C) (Oral)  Resp 20  Ht 5' 6\" (

## 2017-05-05 NOTE — CM/SW NOTE
SHELBY follow up. Pt went for pacemaker last night. PT worked with pt again today and recommended HHC. Met with Pt and 2 of her neighbors, James León and Manfred Davis. Confirmed with pt that her plan will be to return home with [facility]idential [facility].  [facility] will arrange for RN

## 2017-05-05 NOTE — PLAN OF CARE
Pt Ok to go home per primary and cardiology and surgery, follow up appts made. All belongings taken with pt. Pt with friends/neighbors that help pt at home. Pt will be followed up by residential home care.  Pt and friends given pacemaker and TAVR Dc instruc

## 2017-05-08 NOTE — CM/SW NOTE
05/08/17 1000   Discharge disposition   Discharged to: Home-Health   Name of Facillity/Home Care/Hospice Residential   Discharge transportation Private car

## 2017-05-10 NOTE — DISCHARGE SUMMARY
General Medicine Discharge Summary     Patient ID:  Marko Myers  80year old  11/23/1926    Admit date: 4/26/2017    Discharge date and time: 5/5/2017  6:20 PM     Attending Physician: Cinthia alfredo.  pro light of possible drug rash to back noted by ID on 5/4    B/l LE edema; may be due to her Aortic valve stenosis and venous stasis changes versus acute on chronic diastolic CHF  No DVT  -patient is on diuretic    B/l LE erythema; seen by ID, per ID do not s Oral Oil  Take by mouth daily as needed for constipation. , Historical    latanoprost 0.005 % Ophthalmic Solution  Place 1 drop into both eyes nightly., Historical          Activity: activity as tolerated  Diet: cardiac diet  Wound Care: as directed  Code S

## 2017-05-12 PROBLEM — Z95.0 PACEMAKER: Status: ACTIVE | Noted: 2017-01-01

## 2017-05-15 NOTE — ED INITIAL ASSESSMENT (HPI)
Patient had ultrasound today and was found to have a blood clot in left arm. Family sts recent discharge form hospital for pacemaker.

## 2017-05-15 NOTE — CM/SW NOTE
Emergency Department Discharge Plan    Katya Pop is a 80year old female who presented to the ED with DVT. ED Case Manager was asked to assist in arranging for home anticoagulation.     Katyabruce Pop and I discussed indications for antico

## 2017-05-15 NOTE — ED PROVIDER NOTES
Patient Seen in: [facility] Emergency Department    History   Patient presents with:  Abnormal Result (metabolic, cardiac)    Stated Complaint: left arm DVT found in U/S today    HPI    This is a 80-year-old female coming with complaints of abnormal Temp src 05/15/17 1755 Oral   SpO2 05/15/17 1755 99 %   O2 Device 05/15/17 1755 None (Room air)       Current:/86 mmHg  Pulse 69  Temp(Src) 98 °F (36.7 °C) (Oral)  Resp 17  Wt 53.5 kg  SpO2 99%        Physical Exam    Alert and oriented patient makenna

## 2017-07-11 NOTE — ED PROVIDER NOTES
Patient Seen in: [facility] Emergency Department    History   Patient presents with:  Dizziness (neurologic)    Stated Complaint: feeling faint    HPI    Patient is a 63-year-old female comes in emergency room for evaluation.   Patient apparently sta Device: None (Room air)    Current:/78   Pulse 61   Temp 98.1 °F (36.7 °C) (Temporal)   Resp 17   Wt 54.9 kg   SpO2 100%   BMI 19.54 kg/m²         Physical Exam    GENERAL: No acute distress, well appearing and non-toxic, Alert and oriented X 3   ILDA Please view results for these tests on the individual orders. RAINBOW DRAW BLUE   RAINBOW DRAW GOLD   RAINBOW DRAW LAVENDER   RAINBOW DRAW LIGHT GREEN    BUN 27.   Hemoglobin 10.0 which is patient's baseline  EKG    Rate, intervals and axes as noted o 079 1249 [id number]      As needed      Medications Prescribed:  Current Discharge Medication List

## 2017-07-11 NOTE — ED NOTES
Pt ambulatory to bathroom with cane. Pt tolerated well. Pt breakfast ordered. Dr. Jesus Manuel Anderson made aware of pt ambulating well.

## 2017-07-11 NOTE — ED INITIAL ASSESSMENT (HPI)
Pt with c/o not feeling well this morning. Pt with recent pacemaker placement. Per paramedics pt lives alone and gets meals on wheels. Medics report that pt's refrigerator is full of uneaten meals. Pt arrives alert and oriented.

## 2017-07-11 NOTE — ED NOTES
Spoke with pt's neighbor Trista Epstein. Trista Epstein states she will pick pt up to go home. Pt's IV dc'd and assisted in getting dressed. Pt placed in wheelchair to wait at nurses station for her ride.

## 2017-11-16 PROBLEM — Z95.2 S/P TAVR (TRANSCATHETER AORTIC VALVE REPLACEMENT): Status: ACTIVE | Noted: 2017-01-01

## 2017-12-06 PROBLEM — R07.9 ACUTE CHEST PAIN: Status: ACTIVE | Noted: 2017-01-01

## 2017-12-06 NOTE — ED PROVIDER NOTES
Patient Seen in: [facility] Emergency Department    History   Patient presents with:  Dyspnea ANA MARIA SOB (respiratory)    Stated Complaint: SOB    HPI    This is a 25-year-old female who arrives by ambulance. The patient states that she was at home. The neck is supple. LUNGS: Clear to auscultation, there is no wheezing or retraction. No crackles. CV: Cardiovascular is regular without murmurs or rubs. ABD: The abdomen is soft nondistended nontender. There is no rebound.   There is no guardin for panel order CBC WITH DIFFERENTIAL WITH PLATELET.   Procedure                               Abnormality         Status                     ---------                               -----------         ------                     CBC W/ DIFFERENTIAL[[id number] with occasional PVCs. There is no acute ST elevations or ischemic findings. The rest of the EKG including rate rhythm axis and intervals I agree with the EKG report .  The rate is 70    Admission disposition: 12/6/2017  5:51 PM       The patient's CT at t

## 2017-12-07 NOTE — PLAN OF CARE
CARDIOVASCULAR - ADULT    • Maintains optimal cardiac output and hemodynamic stability Progressing    • Absence of cardiac arrhythmias or at baseline Progressing          Alert to name, name of hospital, month-  When asked the date patient stating \"wednes

## 2017-12-07 NOTE — CM/SW NOTE
12/07/17 1500   CM/SW Referral Data   Referral Source Physician;Social Work (self-referral)   Reason for Referral Discharge planning   Informant Patient;Friend   Pertinent Medical Hx   Primary Care Physician Name Edna [phone]   Patient Info   Patient's Menta piles of papers in the home and needs help to get stuff cleaned out. Patient is agreeable to discussing a care giver and open to Nano Alexander Rn to come in. PT has recommended home.     Patient has hx with [facility] and would like to use them again, referral sent via pa

## 2017-12-07 NOTE — PLAN OF CARE
CARDIOVASCULAR - ADULT    • Maintains optimal cardiac output and hemodynamic stability Progressing    • Absence of cardiac arrhythmias or at baseline Progressing        METABOLIC/FLUID AND ELECTROLYTES - ADULT    • Electrolytes maintained within normal hook

## 2017-12-07 NOTE — ED NOTES
Susana Sanchez RN to be given report. Pt in hallway, A pod, near nursing desk. Pt continues to be pain-free. Vitals stable. Heparin infusing via pump at  Ordered rate, see MAR.

## 2017-12-07 NOTE — PHYSICAL THERAPY NOTE
PHYSICAL THERAPY QUICK EVALUATION - INPATIENT    Room Number: 4528/8332-B  Evaluation Date: 12/7/2017  Presenting Problem: \"internal shaking\"  Physician Order: PT Eval and Treat    Problem List  Principal Problem:    Acute chest pain      Past Medical COGNITION    Overall Cognitive Status:  WFL - within functional limits  Orientation Level:  oriented x4  Memory:  decreased short term memory and only able to recall 1/3 on word recall test  Safety Judgement:  good awareness of safety precautions  Aware measures completed include Elderly mobility scale. The patient scored 17/20 on the Elderly Mobility Scale, indicating patient is independent in terms of safe mobility.   Based on this evaluation, patient's clinical presentation is stable and overall evaluat

## 2017-12-07 NOTE — PLAN OF CARE
Received pt from er w/ chief complaint of palpitations. Denies cp, sob. Alert. Oriented. Forgetful. [place]. Wears portia hearing aids but rt one is broken. Denies pain, sob. Up w/ sb and walker. poc discussed with patient.

## 2017-12-07 NOTE — PROGRESS NOTES
[facility] Cardiology Progress Note - Tate Romero Patient Status:  Inpatient    1926 MRN [id number]   [facility] 2NE-A Attending Cristina Pruitt MD   Hosp Day # 1 PCP Thao Juarez MD     [facility] Normocephalic, anicteric sclera, neck supple, no thyromegaly or adenopathy. Neck: No JVD, carotids 2+, no bruits. Cardiac: Regular rate and rhythm. S1, S2 normal. No murmur, pericardial rub, S3, thrill, heave or extra cardiac sounds.   Lungs: Clear withou

## 2017-12-07 NOTE — OCCUPATIONAL THERAPY NOTE
OCCUPATIONAL THERAPY QUICK EVALUATION - INPATIENT    Room Number: 4296/9541-K  Evaluation Date: 12/7/2017     Type of Evaluation: Initial  Presenting Problem: Acute Chest pain    Physician Order: IP Consult to Occupational Therapy  Reason for Therapy:  ADL medications. Arch Eduarda     RANGE OF MOTION AND STRENGTH ASSESSMENT  Upper extremity ROM is within functional limits    Upper extremity strength is within functional limits   NEUROLOGICAL FINDINGS  Neurological Findings: Coordination - Finger to Nose;Coordination - R manager/    ASSESSMENT     Patient is a 80year old female admitted on 12/6/2017 for Acute Chest pain. Complete medical history and occupational profile noted above. Pt is at baseline in relation to skilled OT needs.  Comprehensive cognitive s

## 2017-12-07 NOTE — CONSULTS
[facility]  Report of Consultation    Mark Anthony Vee Patient Status:  Emergency    1926 MRN [id number]   Location [facility] Attending Bartolome Cheung MD   Hosp Day # 0 PCP Clarence Smith MD     Reason for time.    History:  Past Medical History:   Diagnosis Date   • Aortic valve stenosis    • Back pain    • Glaucoma    • [place] (hard of hearing)    • Pacemaker    • Paralysis (HCC)     left arm paralysis since birth   • Rosacea    • Spinal stenosis      Past Andujar again noted. Stable left-sided pacemaker. Hyper expansion of the lungs. Minimal atelectasis within the left   lower lung zone. 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     Labs:     Recent Labs   12/06/17  1654   WBC 4.6   HGB 10.7*   MCV

## 2017-12-07 NOTE — H&P
[facility] Hospitalist History and Physical      Patient presents with:  Dyspnea ANA MARIA SOB (respiratory)       PCP: Jennifer Burleson MD      History of Present Illness: Patient is a 80year old female with PMH sig for severe AS sp TAVR, chb sp PPM, presebts for e normal. No masses,  No organomegaly. Non distended   Extremities: Extremities normal, atraumatic, no cyanosis or edema. Skin: Skin color, texture, turgor normal. No rashes or lesions.     Neurologic: Normal strength, no focal deficit appreciated     Data Normal. PLEURA:  Normal. CARDIAC:  Normal CHEST WALL:  Normal. LIMITED ABDOMEN:  Calcifications of the left adrenal gland may represent prior adrenal hemorrhage. Left renal cyst. VASCULATURE:  No acute pulmonary embolus.   Atheromatous changes of the aorta MD Justin               Assessment/Plan:     NSTEMI  - seen by cards.   Possibly embolized plaque down coronary  - cont heparin gtt,     Anemia  - Hg stable from baseline    pulm nodules  - repeat CT one year      Outpatient records or previous hospital bala

## 2017-12-08 NOTE — CM/SW NOTE
12/8/17 11:53am  MSW called Creyen Pen,; left message. Who is listed as patient's friend/. Per patient on 12/7, he is HCPOA but not copy on chart.      MSW to follow

## 2017-12-08 NOTE — PLAN OF CARE
Notified Dr. Scherer Pi that pt blood pressure is 111/71 before administering the Amiodarone dose, orders received to go ahead and give the drug. Will continue to closely monito.

## 2017-12-08 NOTE — PROGRESS NOTES
Cardiology Post Procedure Note  Left and right coronary angiogram from right femoral site. Preliminary shows mild coronary artery narrowing. Tightest lesion of proximal diagonal at ostium, 80% unchanged from pre-TAVR.   There is also small aneurysmal dila

## 2017-12-08 NOTE — PLAN OF CARE
Patient walking back from bathroom with RN assist- patient sat at the bed, stated she felt dizzy, VTACH 230's on the monitor, RN called for crash cart, called to start a code, patient pale, stating \"I'm burning up\", laid flat immediately on getting in be

## 2017-12-08 NOTE — PROCEDURES
169 [facility] Patient Status:  Inpatient    1926 MRN [id number]   [facility] 6NE-A Attending Shana Lesches, MD   Hosp Day # 2 PCP Ana Maria Tracy MD                                              Card myself and the cath lab staff. IV access was established and IV fluids were maintained. Initially and throughout the course and after the procedure the state of conscious sedation was assessed and supervised.   Patient received intravenous medications ord of the vessel. 50-70% narrowings are seen of the small vessel. Right iliofemoral angiograph: The right iliofemoral artery was widely patent. The arterial sheath was in a favorable position for intra-arterial closure.   A 6 [place] Angio-Seal was used to

## 2017-12-08 NOTE — PROGRESS NOTES
[facility] Cardiology Progress Note - Jareth Soares Patient Status:  Inpatient    1926 MRN [id number]   [facility] 6NE-A Attending Andres Burnette MD   [facility] Day # 2 PCP MD Kirsty Meléndez constitutional distress. HEENT: Normocephalic, anicteric sclera, neck supple, no thyromegaly or adenopathy. Neck: No JVD, carotids 2+, no bruits. Cardiac: Regular rate and rhythm.  S1, S2 normal. No murmur, pericardial rub, S3, thrill, heave or extra car EC EC tab 325 mg 325 mg Oral Daily   acetaminophen (TYLENOL) tab 650 mg 650 mg Oral Q6H PRN   ondansetron HCl (ZOFRAN) injection 4 mg 4 mg Intravenous Q6H PRN   furosemide (LASIX) tab 20 mg 20 mg Oral Daily   latanoprost (XALATAN) 0.005 % ophthalmic soluti

## 2017-12-08 NOTE — CONSULTS
I was asked by Dr. Afsaneh Corado to evaluate for VT    80year old female with PMhx as below admitted for chest \"vibrating\" with NSTEMI and troponin elevated    On interrogation noted to have runs of  bpm.  Today had two episodes of VT at 230 (9am, while --    PTT  27.8  50.6*  52.0*  55.3*       Chem:  Recent Labs   Lab  12/06/17   1654  12/07/17   0610  12/07/17   1446   NA  140  144   --    K  3.9  3.4*  4.5   CL  105  111   --    CO2  27.0  27.0   --    BUN  33*  25*   --    MG   --   2.2   --    ALT Start toprol 25 for its anti arrhythmic benefit (has plenty of BP room) Please start amio gtt (standard load, ordered) and transfer the patient the unit. She will need a coronary angiogram to evaluate her CAD.   If she is stented she will need a lifevest

## 2017-12-08 NOTE — PROGRESS NOTES
[facility] Hospitalist Progress Note                                                                   169 [facility]  11/23/1926    SUBJECTIVE:  No acute events.       OBJECTIVE:  Temp:  [97. HCl    Assessment/Plan:  Principal Problem:    Acute chest pain      NSTEMI, recurrent sustained ventricular tachycardia  - on PPM interrogation and again this am, seen by EP, transferred to CCU  - IV amio gtt, cont heparin gtt  - plan cath and possible AI

## 2017-12-09 NOTE — PROGRESS NOTES
[facility] Hospitalist Progress Note                                                                   169 [facility]  11/23/1926    SUBJECTIVE:  No acute events. No recurrent arthymias. tachycardia  - plan ugrade to defib on mondary  - IV amio gtt, cont heparin gtt    Anemia  - Hg stable from baseline     pulm nodules  - repeat CT one year      Mario Gonzales  [facility] Hospitalist  Pager: [phone]

## 2017-12-09 NOTE — PLAN OF CARE
Pt. From floor for monitoring possible AICD placement Monday 12/11, pt. Went to cathlab at about [phone] today, pt. Right groining intact, vitals stable, heparin drip stopped, no complaints of pain, all questions answered, pt.  Is forgetful, will continue to m

## 2017-12-09 NOTE — PROGRESS NOTES
[facility] Cardiology Progress Note - Bethany Ham Patient Status:  Inpatient    1926 MRN [id number]   [facility] 6NE-A Attending Jasmin Driscoll MD   [facility] Day # 3 PCP MD Porsha Lux rhythm. S1, S2 normal. No murmur, pericardial rub, S3, thrill, heave or extra cardiac sounds. Lungs: Clear without wheezes, rales, rhonchi or dullness. Normal excursions and effort. Abdomen: Soft, non-tender.  No organosplenomegally, mass or rebound, BS- MD  12/9/2017  7:14 AM

## 2017-12-10 NOTE — PLAN OF CARE
Pt. Follows commands is forgetful but is redirected easily, no complaints of pain, after saline bolus vitals normal, pt. Ambulated halls x3 today, all questions answered, call light within reach, will continue to monitor.

## 2017-12-10 NOTE — PROGRESS NOTES
[facility] Hospitalist Progress Note                                                                   169 [facility]  11/23/1926    SUBJECTIVE:  No acute events. No recurrent arthymias.   [facility] Hospitalist  Pager: [phone]

## 2017-12-10 NOTE — PLAN OF CARE
CARDIOVASCULAR - ADULT    • Maintains optimal cardiac output and hemodynamic stability Progressing    • Absence of cardiac arrhythmias or at baseline Progressing        SAFETY ADULT - FALL    • Free from fall injury Progressing        Assumed care of patie

## 2017-12-10 NOTE — PROGRESS NOTES
[facility] Cardiology Progress Note - Mari Counter Patient Status:  Inpatient    1926 MRN [id number]   [facility] 6NE-A Attending Lisa Da Silva MD   [facility] Day # 4 PCP MD Griffin Armas No JVD, carotids 2+, no bruits. Cardiac: Regular rate and rhythm. S1, S2 normal. No murmur, pericardial rub, S3, thrill, heave or extra cardiac sounds. Lungs: Clear without wheezes, rales, rhonchi or dullness. Normal excursions and effort.   Abdomen: Sof

## 2017-12-10 NOTE — PLAN OF CARE
Pt cont to c/o \"shaking inside\". No change in vs, pulse apical and radial, sbp 140s. Checked accucheck was 109. Pt reassured. Complaining of constipation had 1 small formed BM will ambulate again and sit up in chair.  Dr Daina Cordon paged and orders receiv

## 2017-12-10 NOTE — PLAN OF CARE
Pt called via nurse button that she was\" shaking all inside but you would never know from the outside\". She reports this started 10 minutes prior to this note.  No arrythmia noted on telemetry remains av paced- b/p 111/63- radial pulses palpable and stron

## 2017-12-11 NOTE — PLAN OF CARE
Received this am A/O x 4- short term memory deficit/forgetful- sometimes noted talking to self softly in room when no one else present. Cooperative and able to express  Feelings and needs appropriately. Room air- no shortness of breath.  tolerated most of

## 2017-12-11 NOTE — PROGRESS NOTES
[facility] Hospitalist Progress Note                                                                   169 [facility]  11/23/1926    SUBJECTIVE:  Early this am w increased work of breathing. NRB early am  - from suspected acute diastolic CHF w flash pulm edema  - aggressive IV diuresis    Anemia  - Hg stable from baseline     pulm nodules  - repeat CT one year    Hypokalemia  - replete, mag ok      Erika Jewell  [facility] Hospitalist  Pager: [phone]

## 2017-12-11 NOTE — PLAN OF CARE
PT DROWSY, EASILY AROUSABLE, FORGETFUL, ANXIOUS IN EVENING, GIVEN XANAX, VOIDING FREQUENTLY SMALL AMOUNTS, WEAK WHEN UP TO COMMODE, INCONTINENT AT NIGHT, VOIDING IN BREIF, TEMP 100.8-100.5, DESATED WHEN ASLEEP, 2L NC APPLIED, 96% ON 2L, CONGESTED COUGH, NO

## 2017-12-11 NOTE — PROGRESS NOTES
80year old female with plan for ICD upgrade today for sustained VT but at 4am had increased WOB and needed BiPAP. Lasix had been decreased yesterday    More agitated confused today.   No VT    Denies chest pain, shortness of breath, palpitations, lighthea

## 2017-12-11 NOTE — CONSULTS
Critical Care H&P/Consult       NAME: Andie Varela Tyrone: [id number] - MRN: [id number] - Age: 80year old - :  1926    Date of Admission: 2017  4:34 PM  Admission Diagnosis: Acute chest pain [R07.9]  Reason for Consult: Acute hypoxic latanoprost 0.005 % Ophthalmic Solution Place 1 drop into both eyes nightly. Disp:  Rfl:  12/5/2017 at Unknown time   mineral oil Oral Oil Take 5 mL by mouth every evening.  for constipation  Disp:  Rfl:  Unknown at Unknown time       Cefepime REVIEW OF SYSTEMS: sensitivity is decreased due to AMS  GENERAL:  feels well otherwise   SKIN:  denies any unusual skin lesions   EYES:  denies blurred vision or double vision   HEENT:  denies nasal congestion, sinus pain/tenderness  RESP:  See above bruit or JVD   Lungs:     Distant but clear bilaterally   Chest wall:    No tenderness or deformity   Heart:    Regular rate and rhythm, S1 and S2 normal, no murmur, rub   or gallop   Abdomen:     Soft, non-tender, bowel sounds active all four quadrants, Jose Alejandro Chowdhury  [facility] Pulmonary and Critical Care

## 2017-12-11 NOTE — CM/SW NOTE
MSW left another message for the patient's friend Charanjit Landry in hopes to obtain a HCPOA document for the chart. The mailbox is full and not accepting new messages.     Gabriel Leong, [facility]

## 2017-12-11 NOTE — PLAN OF CARE
Pt cont to c/o \"shaking inside\". No change in vs, pulse apical and radial, sbp 150s. Pt insistent that I call \"Dr Misbah Bianchi or Harry\" as she \"knows something not right\".  Dr Misbah Bianchi was called and updated on pt complaints, recent labs, physical assessm

## 2017-12-11 NOTE — PROGRESS NOTES
[facility] Cardiology Progress Note - Rd Sovereign Patient Status:  Inpatient    1926 MRN [id number]   [facility] 6NE-A Attending Sami Diego MD   [facility] Day # 5 PCP MD Steff Vides distress. HEENT: Normocephalic, anicteric sclera, neck supple, no thyromegaly or adenopathy. Neck: No JVD, carotids 2+, no bruits. Cardiac: Regular rate and rhythm. S1, S2 normal. No murmur, pericardial rub, S3, thrill, heave or extra cardiac sounds.   L EC tab 325 mg 325 mg Oral Daily   acetaminophen (TYLENOL) tab 650 mg 650 mg Oral Q6H PRN   ondansetron HCl (ZOFRAN) injection 4 mg 4 mg Intravenous Q6H PRN   latanoprost (XALATAN) 0.005 % ophthalmic solution 1 drop 1 drop Both Eyes Nightly   Alfuzosin HCl

## 2017-12-12 NOTE — PROGRESS NOTES
[facility] Cardiology Progress Note - Madhuri Curtis Patient Status:  Inpatient    1926 MRN [id number]   [facility] 6NE-A Attending Capri Rivas MD   [facility] Day # 6 PCP MD Kayla Lugo 0400 : 121 lb 11.1 oz (55.2 kg)  12/08/17 0125 : 125 lb 7.1 oz (56.9 kg)  12/07/17 0413 : 126 lb 15.8 oz (57.6 kg)  12/06/17 2033 : 134 lb (60.8 kg)  12/06/17 1645 : 136 lb (61.7 kg)  11/16/17 1452 : 128 lb (58.1 kg)  07/11/17 0750 : 121 lb 0.5 oz (54.9 kg (Diuretic)   ipratropium-albuterol (DUONEB) nebulizer solution 3 mL 3 mL Nebulization Q4H PRN   0.9%  NaCl infusion  Intravenous Continuous   aspirin chewable tab 324 mg 324 mg Oral Once   docusate sodium (COLACE) cap 100 mg 100 mg Oral BID   magnesium hyd

## 2017-12-12 NOTE — PLAN OF CARE
Pt received pt 1930 drowsy, arousable to name being called. Pt oriented to self and place, reoriented to events and time. Pt MACK, follows simple commands. Pt denies any pain.  Pt with tachypneic shallow breaths, crackles bilat posterior bases R>L, exp Grant Caroline

## 2017-12-12 NOTE — SLP NOTE
ADULT SWALLOWING EVALUATION    ASSESSMENT    ASSESSMENT/OVERALL IMPRESSION:  Patient seen for swallowing evaluation after concern for aspiration following coughing episode with drinking water.   Patient is lethargic with eyes closed but able to follow comma Functional Limits  Strength:  (reduced)  Tone: Within Functional Limits  Range of Motion:  (reduced)  Rate of Motion: Reduced    Voice Quality: Weak  Respiratory Status: Supplemental O2;Vapotherm (30LPM; 50% FiO2)  Consistencies Trialed:  (moist swab)  Met

## 2017-12-12 NOTE — CONSULTS
[facility] Consult Note:  Medication List Evaluation for Delirium    Robby Oakley is a 80year old female admitted on 12/6/17 who has tested positive for delirium per RN evaluation.   Pharmacy has been consulted to evaluate her current medications

## 2017-12-12 NOTE — PLAN OF CARE
Pharmacy Note: Renal dose adjustment    Meropenem was started by primary service for UTI and possible aspiration pneumonia. Frequency adjusted from q8 hours to q12 hours per renal dosing protocol.      Thank you,  Rosa Shaikh, PharmD  12/12/2017 2:53 PM

## 2017-12-12 NOTE — PLAN OF CARE
Received this am, on vapotherm, attempted to wean down, but dropped sao2 to upper 80s'. Tolerated transition to high flow nasal cannula later in day.  Speech at bedside to evaluate, noc shift reported coughing when given water, will keep npo per speech recom

## 2017-12-12 NOTE — PLAN OF CARE
Problem: Impaired Swallowing  Goal: Minimize aspiration risk  Interventions:  - NPO  - Oral Care  Outcome: Progressing

## 2017-12-12 NOTE — PROGRESS NOTES
[facility] Hospitalist Progress Note                                                                   169 [facility]  11/23/1926    SUBJECTIVE: Continued clinical decline.   Was on BiPAP over suspect from acute diastolic CHF and now possible pna, possible aspiration pna  - pulm consult appreciated  - cont high flow 02, Iv diuresis, check ABG, start IV abx (meropenem)    UTI  - IV abx as above, fu UC  - elevated procal noted    NSTEMI, recurrent

## 2017-12-13 NOTE — PROGRESS NOTES
[facility] Hospitalist Progress Note                                                                   169 [facility]  11/23/1926    SUBJECTIVE: Remains lethargic. Comfortable appearing. acetaminophen, ipratropium-albuterol, magnesium hydroxide, ALPRAZolam, acetaminophen, ondansetron HCl    Assessment/Plan:  Acute hypoxemic resp failure  - suspect from acute diastolic CHF and now possible pna, possible aspiration pna  - pulm consult apprec

## 2017-12-13 NOTE — PLAN OF CARE
CARDIOVASCULAR - ADULT    • Maintains optimal cardiac output and hemodynamic stability Progressing    • Absence of cardiac arrhythmias or at baseline Progressing          METABOLIC/FLUID AND ELECTROLYTES - ADULT    • Electrolytes maintained within normal l

## 2017-12-13 NOTE — PROGRESS NOTES
[facility] Cardiology Progress Note - Delfino Rodgers Patient Status:  Inpatient    1926 MRN [id number]   [facility] 6NE-A Attending Dori Jay MD   Hosp Day # 7 PCP Marcelo Linares MD     Subjective: 7.8 oz (54.2 kg)  12/11/17 0300 : 124 lb 1.9 oz (56.3 kg)  12/11/17 0000 : 126 lb 15.8 oz (57.6 kg)  12/10/17 0500 : 124 lb 1.9 oz (56.3 kg)  12/09/17 0400 : 121 lb 11.1 oz (55.2 kg)  12/08/17 0125 : 125 lb 7.1 oz (56.9 kg)  12/07/17 0413 : 126 lb 15.8 oz DBIL, TPROT    No results for input(s): TROP in the last 72 hours.     Allergies:     Cefepime                Hives    Medications:    Current Facility-Administered Medications:  potassium chloride 40 mEq in sodium chloride 0.9 % 250 mL IVPB 40 mEq Intraven

## 2017-12-13 NOTE — CM/SW NOTE
MCKENZIE Machado informed SW that the pt's POA is Chayito Patten [phone], cell [phone]. Patient is his aunt. Per the RN patient being transferred to [facility] [phone].   Shereen Santana, 12/13/17, 3:05 PM

## 2017-12-13 NOTE — CM/SW NOTE
Attempted to meet with pt to discuss eventual care issues at [place]. Pt barely able to stay awake and was unable to participate in conversation. SW called pt's  Jose Rafael Mejias [phone]. He is not pt's HCPOA. He is trustee of her estate.  He will fax

## 2017-12-13 NOTE — PLAN OF CARE
Pt received at 1930 drowsy but easily arousable with name being called, Pt MARTINA, follows simlple commands. Pt oriented to name and place, reoriented to date and events. Pt calling out repeatedly for water. Pt remains NPO at this time.  At 2100, pt converted

## 2017-12-13 NOTE — SLP NOTE
SPEECH DAILY NOTE - INPATIENT    ASSESSMENT & PLAN   ASSESSMENT  Patient seen to assess for improvement in swallow function. She is more alert but remains lethargic.   She is up in the bedside chair and her oxygen requirements have reduced to high flow diego

## 2017-12-13 NOTE — PROGRESS NOTES
[facility] Patient Status:  Inpatient    1926 MRN [id number]   [facility] 6NE-A Attending Sheldon Brothers MD   [facility] Day # 7 PCP Melanie Uriostegui MD     [address] [facility] overall     OBJECTIVE: latanoprost (XALATAN) 0.005 % ophthalmic solution 1 drop, 1 drop, Both Eyes, Nightly  •  Alfuzosin HCl ER (UROXATRAL) 24 hr tab 10 mg, 10 mg, Oral, Daily with breakfast     /62   Pulse 62   Temp (!) 97.5 °F (36.4 °C) (Temporal)   Resp 22   Ht 5' 6\"

## 2017-12-14 PROBLEM — Z71.89 GOALS OF CARE, COUNSELING/DISCUSSION: Status: ACTIVE | Noted: 2017-01-01

## 2017-12-14 PROBLEM — Z51.5 PALLIATIVE CARE ENCOUNTER: Status: ACTIVE | Noted: 2017-01-01

## 2017-12-14 NOTE — CONSULTS
Clarisse 63  [id number]  Hospital Day #8  Date of Consult: 12/14/17       Reason for Consultation: Consult requested for evaluation of palliative care needs and goals of care discussion concerns for nutritional status, ability to provide medications and code status. Rd Gutierrez states he wants patient to remain a FULL CODE. Rd Gutierrez states a dubhoff can be placed to provide nutrition and medications.   Rd Gutierrez states he would like all aggressive measur

## 2017-12-14 NOTE — PHYSICAL THERAPY NOTE
Attempted to see pt today for PT eval. Per RN, the pt had not slept all night and was given Ativan and is currently very drowsy and sleeping. Will attempt again as schedule allows. Thank you.

## 2017-12-14 NOTE — CM/SW NOTE
SW spoke with pt's  Jamar Kramer regarding HCPOA and informed he is the POA of finances. There is a 7 page fax on pt's chart for a HCPOA dated 03/11/2017 naming pt's nephew Taras Solid as agent, pages 8-19 with signatures are missing.  La Shaikh

## 2017-12-14 NOTE — HOME CARE LIAISON
Discussed patient in rounds today with  Linda. Will defer meeting with patient until she is mentally appropriate and PT re-evaluates her.   Thank you for the referral

## 2017-12-14 NOTE — PROGRESS NOTES
PSYCH CONSULT    Date of Admission: 12/6/17  Date of Consult: 12/14/17  Reason for Consultation: Agitation    Impression:  AXIS 1: Acute delirium, multifactorial from UTI/possible pneumonia, hypoxic resp failure, SYLVIA, sleep deprivation, meds (ie benzos).

## 2017-12-14 NOTE — PROGRESS NOTES
Pulmonary Progress Note     Assessment / Plan:  1. Acute hypoxic resp failure - resolved. Due to aspiration PNA and pulm edema  -on RA  -diuresis  2.  Aspiration PNA - needing GNR coverage  -CXR with bilateral infiltrates that are stable  -elevated PCT  -on

## 2017-12-14 NOTE — SLP NOTE
Attempted follow up, patient lethargic from effects of medication overnight and not appropriate for po. Met with patient neighbor at bedside and updated on the patient's swallowing status and that we will reattempt as her alertness improves.   I left my pa

## 2017-12-14 NOTE — OCCUPATIONAL THERAPY NOTE
IP OT attempt to see patient for therapeutic assessment/treatment.   RN Samuel Kaplan) states that patient is very lethargic and sleeping this AM.  Patient up awake all night, received Ativan early this AM.    Has video swallow scheduled later this AM.  Will atte

## 2017-12-14 NOTE — CONSULTS
[facility]  Report of Psychiatric Consultation    Manjeet Burkett Patient Status:  Inpatient    1926 MRN [id number]   [facility] 2NE-A Attending Rubina Swift MD   Hosp Day # 8 PCP Willard Reid MD     Date of Admissio Glaucoma    • [place] (hard of hearing)    • Pacemaker    • Paralysis ([address]     left arm paralysis since birth   • Rosacea    • Spinal stenosis      Past Surgical History:  No date: OTHER      Comment: appe  No date: OTHER      Comment: breast biopsy 70    All assess  Thought content: unable to obtain    Orientation: unable to obtain  Attention and Concentration: poor    Insight: impaired  Judgment: impaired      Laboratory Data:    Lab Results  Component Value Date   WBC 12.8 12/14/2017   HGB 12.7 12/14/2017

## 2017-12-14 NOTE — PLAN OF CARE
Problem: Impaired Swallowing  Goal: Minimize aspiration risk  Interventions:  - NPO  - Oral Care   Outcome: Not Progressing

## 2017-12-14 NOTE — DIETARY NOTE
NUTRITION INITIAL ASSESSMENT    Pt is at moderate nutrition risk. Pt does not meet malnutrition criteria. NUTRITION DIAGNOSIS/PROBLEM:    Inadequate oral intake related to inability to consume oral diet as evidenced by pt failed swallow evaluation.     Rhonda Sanchez Mass: BMI: 18.04     2.  Fluid Accumulation: pulmonary edema    NUTRITION PRESCRIPTION: based on CBW 50.7 kg  Calories: 0395-6396 calories/day (35-40 calories per kg)  Protein: 60-76 grams protein/day (1.2-1.5 grams protein per kg)  Fluid: ~1 ml/kcal or per

## 2017-12-14 NOTE — SLP NOTE
SPEECH DAILY NOTE - INPATIENT    ASSESSMENT & PLAN   ASSESSMENT  Patient seen to assess for improvement in swallow function. She remains lethargic but will follow simple commands at times. She is not verbal at this time.   This is a significant change fro

## 2017-12-14 NOTE — PROGRESS NOTES
[facility] Cardiology Progress Note - Wan Hernandez Patient Status:  Inpatient    1926 MRN [id number]   [facility] 2NE-A Attending Kristine Fulton MD   Hosp Day # 8 PCP Mayela Gutierrez MD     Subjective: x 3. No apparent distress. No respiratory or constitutional distress. HEENT: Normocephalic, anicteric sclera, neck supple, no thyromegaly or adenopathy. Neck: No JVD, carotids 2+, no bruits. Cardiac: Regular rate and rhythm.  S1, S2 normal. No murmur, pe BID (Diuretic)   ipratropium-albuterol (DUONEB) nebulizer solution 3 mL 3 mL Nebulization Q4H PRN   aspirin chewable tab 324 mg 324 mg Oral Once   docusate sodium (COLACE) cap 100 mg 100 mg Oral BID   magnesium hydroxide (MILK OF MAGNESIA) 400 MG/5ML suspe

## 2017-12-14 NOTE — PLAN OF CARE
CARDIOVASCULAR - ADULT    • Maintains optimal cardiac output and hemodynamic stability Not Progressing    • Absence of cardiac arrhythmias or at baseline Not Progressing          Impaired Swallowing    • Minimize aspiration risk Not Progressing          Al

## 2017-12-14 NOTE — PROGRESS NOTES
[facility] Hospitalist Progress Note                                                                   169 [facility]  11/23/1926    SUBJECTIVE: Still lethargic this AM. Reported much better mg Oral Daily   • latanoprost  1 drop Both Eyes Nightly   • Alfuzosin HCl ER  10 mg Oral Daily with breakfast     Continuous Infusions:   • norepinephrine     • sodium chloride 10 mL/hr at 12/13/17 1200     PRN: LORazepam, acetaminophen, ipratropium-albute

## 2017-12-15 PROBLEM — J18.9 PNEUMONIA: Status: ACTIVE | Noted: 2017-01-01

## 2017-12-15 NOTE — PROGRESS NOTES
[facility] Cardiology Progress Note - Mari Counter Patient Status:  Inpatient    1926 MRN [id number]   [facility] 2NE-A Attending Rubina Swift MD   Hosp Day # 0 PCP Willard Reid MD     Subjective: or extra cardiac sounds. Lungs: Clear without wheezes, rales, rhonchi or dullness. Normal excursions and effort. Abdomen: Soft, non-tender. No organosplenomegally, mass or rebound, BS-present. Extremities: Without clubbing, cyanosis or edema.   Peripher suppository 650 mg 650 mg Rectal Q6H PRN   bisacodyl (DULCOLAX) rectal suppository 10 mg 10 mg Rectal Daily PRN       Wesley Middletno MD  12/15/2017  2:16 PM

## 2017-12-15 NOTE — PROGRESS NOTES
12/15/17 1146   Clinical Encounter Type   Visited With Patient  (Responded to consult.   Nurse requested a blessing for patient.)   Continue Visiting No  ( remains available at pager #[phone] as needed/requested.)   Patient's Supportive Strategies/R

## 2017-12-15 NOTE — SLP NOTE
Spoke to RN prior to entering the room. Plan is for patient to transition to Hospice. Will discharge from skilled speech/swallowing service.     Melissa Fowler MA, [address] [facility]  Speech Language Pathologist

## 2017-12-15 NOTE — HOSPICE RN NOTE
Referral received from Palliative care team. Met with secondary KARTHIK Black and obtained consents for hospice (Clara Ham not available by phone, VMs left on two numbers listed). Patients death appears imminent. RR 42/minute.  Updated Medical director on patient, pa

## 2017-12-15 NOTE — PROGRESS NOTES
Pulmonary Progress Note     Assessment / Plan:  1. Acute hypoxic resp failure - due to aspiration PNA and pulm edema. Now significantly worse this morning  -CXR pending  -trial of bipap  2.  Aspiration PNA - needing GNR coverage  -elevated PCT  -on meropene

## 2017-12-15 NOTE — HOSPICE RN NOTE
2pm- Paged  to inquire about getting a LEEANNA volunteer to sit with this patient so she is not alone.

## 2017-12-15 NOTE — PLAN OF CARE
Assumed patient care at 2300, patient is drowsy, non-verbal, reacts to pain  Bilateral soft wrist restraints in place, frequent nursing rounds, comfort measures  Beth catheter with clear/yellow urine  NGT clamped  V-Paced with underlying A.  Flutter  Plan

## 2017-12-15 NOTE — PLAN OF CARE
Assumed patient care at 0700. Patient lethargic, with minimal response to physical and verbal stimulation. Vital signs stable with exception of elevated respiratory (shallow) breathing. Dr. Roya Penaloza notified, orders received; transfer to ICU.   Dr. Michael Vance

## 2017-12-15 NOTE — HOSPICE RN NOTE
Patient reassessed by Leonor Courtney, she appears much more comfortable now. Morphine drip infusing at 1mg/hr, one bolus dose of morphine 1mg given around 1:30pm.  RR down to 26/minute now/breaths are shallow, HR 64, /70.  LEEANNA volunteer to arrive at 5pm tomina

## 2017-12-15 NOTE — CM/SW NOTE
SW received call from pt's . He is requesting a statement from physician stating pt ;acl decisional capacity so HCPOA and Financial POA can vbe :\"activated\". Informed CTU 2 SW of this.   Delma Spaulding, 12/15/17, 9:43 AM

## 2017-12-15 NOTE — PROGRESS NOTES
[facility] Hospitalist Progress Note                                                                   [address] [facility]  11/23/1926    SUBJECTIVE:   Worsening lethargy this AM. Pt now unrespons hypoxemic resp failure  - suspect from acute diastolic CHF and now possible pna, suspected aspiration pna  - pulm consult appreciated  - cont high flow 02, Iv diuresis, check ABG, IV abx (meropenem)  - worsening lethargy this AM. ABG ok.  CXR appears stable

## 2017-12-15 NOTE — OCCUPATIONAL THERAPY NOTE
IP OT attempt to see patient for therapeutic assessment/treatment. Pt is now on [facility] and is not appropriate for IP OT assessment. Pt DC from IP OT services.     GIULIANA Esquivel, OTR/L  Master of Occupational Therapy  Occupational Therapist, Re

## 2017-12-15 NOTE — DIETARY NOTE
Nutrition Short Note    Consult for tube feeding received. Recommend Jevity 1.2 at 33 ml/hour advancing 10 ml/hour q 6 hours to goal rate of 63 ml/hour. Recommend 100 ml water flush q 4 hours.  This will provide 1814 kcal, 84 grams protein, 1820 ml total fr

## 2017-12-15 NOTE — PLAN OF CARE
Patient readmission to hospice care today. Morphine drip infusing with IV push given previously and improvement in labored breathing. Patient remains unresponsive to verbal or physical stimuli. Frequent turning/repositioning throughout the day.   Ignacia gentile

## 2017-12-15 NOTE — PALLIATIVE CARE NOTE
Kel[facility] [address] Work Follow Up    Discussion:  PC MARCIAN/Paris requested PCSW to contact [facility] to meet with PC APN/Janette and family in pts room.  PCSW contacted [facility] who states she vinh

## 2017-12-15 NOTE — HOME CARE LIAISON
Discussed patient with  Linda and patient has had a decline in condition and made DNR. Will follow for needs.

## 2017-12-15 NOTE — PALLIATIVE CARE NOTE
1806 Doc Herman Follow Up      Rubbie Flank  [id number]       Patient seen and evaluated, no family at bedside. Spoke with Dr. Ferdinand Bui and bedside RN this AM.  Patient's respiratory status worsening - now on BiPAP.     Pa

## 2017-12-15 NOTE — OCCUPATIONAL THERAPY NOTE
IP OT attempt to see patient for therapeutic assessment/treatment. RN Rosalee Cogan)  states that patient is not appropriate for assessment at this time. Will attempt again as able.     Pablo Sims, GIULIANA, OTR/L  Master of Occupational Therapy  Occupational T

## 2017-12-15 NOTE — PROGRESS NOTES
12/15/17 3377   Clinical Encounter Type   Visited With Patient; Health care provider  (LEEANNA rosales and RN)   Routine Visit Follow-up   Patient's Supportive Strategies/Resources LEEANNA volunteer at bedside.   Offered compassionate presence and prayer at p

## 2017-12-15 NOTE — CM/SW NOTE
SW in receipt of completed HCPOA naming pt's nephew Callum Amos as agent. SW returned phone call to pt's  Rogelio Goss confirming receipt of pt's HCPOA.  Pt's  insisting 2 physicians sign a letter stating deeming pt non decisional and inquiring on diagnosis

## 2017-12-16 NOTE — SIGNIFICANT EVENT
Patient is unresponsive and with no tele monitoring. She has shallow breathing. Morphine drip is still going. LEEANNA volunteer  is at bedside.

## 2017-12-16 NOTE — PROGRESS NOTES
[facility] called and notified now that the patient passed away. Personal belongings packed in a bag and labeled with a pt sticker. Beth and midline were removed. Pt was placed in a mortuary care bag, and a pt sticker was put on her toe.   Requ

## 2017-12-16 NOTE — SIGNIFICANT EVENT
Patient`s brother named Vivi Matthews was updated of her sister`s passing. Body was transported to [facility]. Family still to decide about  home.

## 2018-01-12 NOTE — DISCHARGE SUMMARY
General Medicine Discharge Summary     Patient ID:  Mark Anthony Vee  80year old  11/23/1926    Admit date: 12/6/2017    Discharge date and time: 12/15/2017 12:57 PM     Attending Physician: Cinthia att.  p TECHNIQUE:  Three views were obtained. COMPARISON:  None. INDICATIONS:  R hand pain to palpation of fingers and knuckles.  Pt was agitated overnight - ?trauma to hand  PATIENT STATED HISTORY: (As transcribed by Technologist)  Patient offered no additional the bilateral upper lobe airspace disease. No significant effusion. CONCLUSION:  NG tube tip in body of stomach. Improving bilateral upper lobe airspace disease.     Dictated by: Karma Ibrahim MD on 12/14/2017 at 18:58     Approved by: Bhavik Damon,

## 2018-01-12 NOTE — DISCHARGE SUMMARY
General Medicine Discharge Summary     Patient ID:  Doc Keyes  80year old  11/23/1926    Admit date: 12/15/2017    Discharge date and time: 12/16/207  6:20 AM    Attending Physician: Cinthia att.  pr transcribed by Technologist)  Patient offered no additional history at this time. CONCLUSION:    Stable heart size. Decreasing mixed interstitial and airspace disease. No new or worsening process. No large effusion or pneumothorax.   Dictated by:

## 2020-02-05 NOTE — PLAN OF CARE
Pt transferred to room 2623. A/o x 4 at this time, moaning frequently, denies pain when asked. Comfort measures provided. A-v paced on tele monitor. Requiring 3L nasal cannula, sats 92%. Lungs diminished. VSS stable. Strict NPO.  Video swallow Statement Selected

## 2022-08-21 NOTE — HOME CARE LIAISON
Received referral for [facility] in rounds today  Attempted to see patient but she was being transferred to CNICU due to VT per her RN  Thank you for the referral  Will follow patient [FreeTextEntry1] : please see HPI

## 2024-03-05 NOTE — PHYSICAL THERAPY NOTE
Physical Therapy    Due to transition to hospice care, pt is no longer appropriate for skilled PT intervention. Will sign off of case at this time. Quality 226: Preventive Care And Screening: Tobacco Use: Screening And Cessation Intervention: Patient screened for tobacco use and is an ex/non-smoker Quality 47: Advance Care Plan: Advance Care Planning discussed and documented; advance care plan or surrogate decision maker documented in the medical record. Quality 111:Pneumonia Vaccination Status For Older Adults: Patient received any pneumococcal conjugate or polysaccharide vaccine on or after their 60th birthday and before the end of the measurement period Quality 130: Documentation Of Current Medications In The Medical Record: Current Medications Documented Detail Level: Detailed Quality 431: Preventive Care And Screening: Unhealthy Alcohol Use - Screening: Patient not identified as an unhealthy alcohol user when screened for unhealthy alcohol use using a systematic screening method

## (undated) DEVICE — GLOVE SURG TRIUMPH SZ 8

## (undated) DEVICE — OPEN HEART: Brand: MEDLINE INDUSTRIES, INC.

## (undated) DEVICE — PAD ELECTRODE MULTIRADIO ADLT

## (undated) DEVICE — CLAMP INSERT: Brand: STEALTH® CLAMP INSERT

## (undated) DEVICE — SOL LACT RINGERS 1000ML

## (undated) DEVICE — CELL SAVER BAG 600ML 4R2023

## (undated) DEVICE — TIBURON DRAPE TOWELS: Brand: CONVERTORS

## (undated) DEVICE — TAPE UMBILICAL U11T

## (undated) DEVICE — CELL SAVER 5/5+ BOWL KIT-225ML: Brand: HAEMONETICS CELL SAVER 5/5+ SYSTEMS

## (undated) DEVICE — GAUZE SPONGES,12 PLY: Brand: CURITY

## (undated) DEVICE — CELL SAVER RESERVOIR BRAT

## (undated) DEVICE — DRAPE HALF 40X58 DYNJP2410

## (undated) DEVICE — SUTURE PROLENE 2-0 SH

## (undated) DEVICE — TRANSPOSAL ULTRAFLEX DUO/QUAD ULTRA CART MANIFOLD

## (undated) DEVICE — BLADE STERNAL SAW BULK PACK

## (undated) DEVICE — GRAFT PATCH FELT 1X6

## (undated) DEVICE — DRAPE C-ARM UNIVERSAL

## (undated) DEVICE — FLOSEAL SEALENT STERILE 10ML

## (undated) DEVICE — STERILE POLYISOPRENE POWDER-FREE SURGICAL GLOVES: Brand: PROTEXIS

## (undated) NOTE — LETTER
Consent to Procedure/Sedation    Date: ______5/4/17____________    Time: ____1400___________    1. I authorize the performance upon Parveen العراقي the following: permanent pacemaker        2.  I authorize  ____True Gaxiola________ (and whomever patient:  ___________________________    ___________________    Witness: ____________________     Date: ______________    Printed: 2017   1:46 PM    Patient Name: Marva Vázquez        : 1926       Medical Record #: [id number]

## (undated) NOTE — LETTER
[facility]  [address] [facility], [address] [place] [address]    Consent for Operation    Date: __________________    Time: _______________    1.  I authorize the performance upon Emily Longo the following operation:    Procedure(s):  transcatheter a procedure has been videotaped, the surgeon will obtain the original videotape. The hospital will not be responsible for storage or maintenance of this tape.     6. For the purpose of advancing medical education, I consent to the admittance of observers to t STATEMENTS REQUIRING INSERTION OR COMPLETION WERE FILLED IN.     Signature of Patient:   ___________________________    When the patient is a minor or mentally incompetent to give consent:  Signature of person authorized to consent for patient: ____________ supplements, and pills I can buy without a prescription (including street drugs/illegal medications). Failure to inform my anesthesiologist about these medicines may increase my risk of anesthetic complications.   · If I am allergic to anything or have had Anesthesiologist Signature     Date   Time  I have discussed the procedure and information above with the patient (or patient’s representative) and answered their questions. The patient or their representative has agreed to have anesthesia services.     ___

## (undated) NOTE — IP AVS SNAPSHOT
[facility] [place] [address] [place], [address] ~ [phone]                Discharge Summary   4/21/2017    [facility]           Admission Information        Provider Department    4/21/2017 Eli Victoria MD  Theresa Mendieta FOLLOW UP with Milana Villanueva MD   [address] ([place] at [facility])    [address]  [address] [place] [address],[address] 699  137 [facility] [phone]              Immunization History as of 4/21/2017  Never Reviewed    No i coverage. Patient [address] is a Federal Navigator program that can help with your Affordable Care Act coverage, as well as all types of Medicaid plans.   To get signed up and covered, please call [phone] and ask to get set up for an insuran

## (undated) NOTE — ED AVS SNAPSHOT
[facility] Emergency Department    [place]  [address] [address]    Phone:  [phone]    Fax:  [phone]   MRN: [id number]    Department:  [facility] Emergency Department   Date of Visit: IF THERE IS ANY CHANGE OR WORSENING OF YOUR CONDITION, CALL YOUR PRIMARY CARE PHYSICIAN AT ONCE OR RETURN IMMEDIATELY TO THE EMERGENCY DEPARTMENT.     If you have been prescribed any medication(s), please fill your prescription right away and begin taking t

## (undated) NOTE — ED AVS SNAPSHOT
[facility] Emergency Department    [facility]  [address] [address]    Phone:  [phone]    Fax:  [phone]   MRN: [id number]    Department:  [facility] Emergency Department   Date of Visit: You can get these medications from any pharmacy     Bring a paper prescription for each of these medications    - apixaban 2.5 MG Tabs            Discharge References/Attachments     DEEP VEIN THROMBOSIS (DVT) (ENGLISH)      Disclosure     Insurance plans IF THERE IS ANY CHANGE OR WORSENING OF YOUR CONDITION, CALL YOUR PRIMARY CARE PHYSICIAN AT ONCE OR RETURN IMMEDIATELY TO THE EMERGENCY DEPARTMENT.     If you have been prescribed any medication(s), please fill your prescription right away and begin taking t Patient [address] to help you get signed up for insurance coverage. Patient [address] is a Federal Navigator program that can help with your Affordable Care Act coverage, as well as all types of Medicaid plans.   To get signed up and covere

## (undated) NOTE — IP AVS SNAPSHOT
[facility] [place] [address] [place], [address] ~ [phone]                Discharge Summary   4[facility]26/2017    [facility]           Admission Information        Provider Department    4/26/2017 Charlotte Galo MD  8ne-A CONTINUE taking these medications        Instructions Authorizing Provider    Morning Afternoon Evening As Needed    latanoprost 0.005 % Soln   Last time this was given:  1 drop on 5/4/2017  8:45 PM   Commonly known as:  David Cough · Contact [facility] Cardiology [phone] if you are having any surgical procedures    HYGIENE:  · Wash incisions with mild soap and water daily. Showering is allowed. No tube baths until totally healed.   · Do not apply lotions, ointments or creams to the inci Failure to do so may result in damage to the valve that could result in death. FOLLOW-UP CARE:  · You will be seen in the TAVR clinic 1 week after discharge.      CONTACT NUMBERS FOR YOUR REFERENCE:  · [facility] (Dr. Atif Teague) [phone] 116.0 (L) (03/24/17)  12.2    (05/03/17)  5.6 (05/03/17)  3.17 (L) (05/03/17)  9.8 (L) (05/03/17)  29.8 (L) (05/03/17)  94.0    (05/03/17)  147.0 (L)     (05/01/17)  5.0 (05/01/17)  3.83 (05/01/17)  11.5 (L) (05/01/17)  35.8 (05/01/17)  93.5    (05/01/17) - If you are a smoker or have smoked in the last 12 months, we encourage you to explore options for quitting.     - If you have concerns related to behavioral health issues or thoughts of harming yourself, contact [facility]erral [facility] a dark, loose bowel movements           Narcotic Medications     TraMADol HCl 50 MG Oral Tab       Use:  Treat pain   Most common side effects: Constipation, drowsiness, dizziness, urinary retention (inability to urinate)   What to report to your healthcare

## (undated) NOTE — LETTER
Rufino Arias M.D., F.A.C.S. Darrel King M.D., F.A.C.S. Sneha Camarena M.D., Solis Ibanez. TAY Barrow M.D., F.A.C.S. Ancelmo Flores. Milena Sharma M.D., F.A.C.S. Praveen Hess M.D. STEFAN Huynh M.D., F. chance to have all of your questions and concerns answered. If there are any issues which have not been adequately addressed, we ask you to bring them forward so that we can thoroughly address them.     A patient who is fully informed and understands their which might include no surgery, medical therapy, or interventional treatment, among other options and the risks and benefits of the different treatment options:    Yes _____ No _____    A [facility] surgeon as explained to me that if I should so desire, he/she is

## (undated) NOTE — LETTER
[facility] [address], [address]  Consent for Procedure/Sedation    Date: 12/11/2017   Time: 0735      1.  I authorize the performance upon Jaelyn Davis the following:  Converting pacemaker to internal defibrillator device ________________________________    ___________________    Witness: _________________________      Date: ___________________    Printed: 2017   7:33 AM  Patient Name: Doc Keyes        : 1926       Medical Record #: [id number]

## (undated) NOTE — ED AVS SNAPSHOT
[facility] Emergency Department    [place]  [address] [address]    Phone:  [phone]    Fax:  [phone]   MRN: [id number]    Department:  [facility] Emergency Department   Date of Visit: IF THERE IS ANY CHANGE OR WORSENING OF YOUR CONDITION, CALL YOUR PRIMARY CARE PHYSICIAN AT ONCE OR RETURN IMMEDIATELY TO THE EMERGENCY DEPARTMENT.     If you have been prescribed any medication(s), please fill your prescription right away and begin taking t

## (undated) NOTE — ED AVS SNAPSHOT
[facility] Emergency Department  [place]  [address] [address]  Phone:  [phone]  Fax:  [phone]   MRN: [id number]    Department:  [facility] Emergency Department   Date of Visit:  7/11/2017 IF THERE IS ANY CHANGE OR WORSENING OF YOUR CONDITION, CALL YOUR PRIMARY CARE PHYSICIAN AT ONCE OR RETURN IMMEDIATELY TO THE EMERGENCY DEPARTMENT.     If you have been prescribed any medication(s), please fill your prescription right away and begin taking t

## (undated) NOTE — ED AVS SNAPSHOT
[facility] Emergency Department    [place]  [address]    [place] [address]    Phone:  [phone]    Fax:  [phone]   MRN: [id number]    Department:  [facility] Emergency Department   Date of Visit: Discharge Instructions       Rest  Warm compresses  Over-the-counter Aleve twice daily-be sure to take this medication with food and stop if he develops stomach upset  Norco for severe pain    Call a primary care doctor today to arrange follow-up next w [facility] Emergency Department. Follow-up care is at the discretion of that Physician. IF THERE IS ANY CHANGE OR WORSENING OF YOUR CONDITION, CALL YOUR PRIMARY CARE PHYSICIAN AT ONCE OR RETURN IMMEDIATELY TO THE EMERGENCY DEPARTMENT.     If you hav harming yourself, contact [facility] and Referral Center at [phone]. - If you don’t have insurance, Kat Goldman has partnered with [address] to help you get signed up for insurance coverage.   Patient Jolley Enter your [employer] Activation Code exactly as it appears below along with your Zip Code and Date of Birth to complete the sign-up process. If you do not sign up before the expiration date, you must request a new code.     Your unique [employer] Access Code: [email or web address]

## (undated) NOTE — LETTER
[address] [facility] FOR BLOOD OR BLOOD COMPONENTS      In the course of your treatment, it may become necessary to administer a transfusion of blood or blood components.  This form provides basic information concerning this proc explain the alternatives to you if it has not already been done. I,Misty Bundy, have read/had read to me the above. I understand the matters bearing on the decision whether or not to authorize a transfusion of blood or blood components.  I have no

## (undated) NOTE — LETTER
Consent to Procedure/Sedation    Date: _______5/4/17______    Time: _____1400______    1. I authorize the performance upon Emily Longo the following: implantable pacemaker        2.  I authorize Dr. Alec Sawyer___ (and whomever is designated ___________________________    ___________________    Witness: ____________________     Date: ______________    Printed: 2017   1:44 PM    Patient Name: Jaspreet Villedahalina        : 1926       Medical Record #: [id number]